# Patient Record
Sex: MALE | Race: WHITE | URBAN - METROPOLITAN AREA
[De-identification: names, ages, dates, MRNs, and addresses within clinical notes are randomized per-mention and may not be internally consistent; named-entity substitution may affect disease eponyms.]

---

## 2018-05-25 ENCOUNTER — EMERGENCY (EMERGENCY)
Facility: HOSPITAL | Age: 68
LOS: 0 days | Discharge: HOME | End: 2018-05-25
Attending: EMERGENCY MEDICINE | Admitting: EMERGENCY MEDICINE

## 2018-05-25 VITALS
RESPIRATION RATE: 18 BRPM | DIASTOLIC BLOOD PRESSURE: 69 MMHG | SYSTOLIC BLOOD PRESSURE: 121 MMHG | TEMPERATURE: 98 F | HEART RATE: 85 BPM | HEIGHT: 73 IN | OXYGEN SATURATION: 97 % | WEIGHT: 240.08 LBS

## 2018-05-25 DIAGNOSIS — M25.562 PAIN IN LEFT KNEE: ICD-10-CM

## 2018-05-25 DIAGNOSIS — Z87.39 PERSONAL HISTORY OF OTHER DISEASES OF THE MUSCULOSKELETAL SYSTEM AND CONNECTIVE TISSUE: ICD-10-CM

## 2018-05-25 RX ORDER — IBUPROFEN 200 MG
600 TABLET ORAL ONCE
Qty: 0 | Refills: 0 | Status: COMPLETED | OUTPATIENT
Start: 2018-05-25 | End: 2018-05-25

## 2018-05-25 RX ADMIN — Medication 600 MILLIGRAM(S): at 09:25

## 2018-05-25 NOTE — ED PROVIDER NOTE - NS ED ROS FT
Constitutional: No fever or chills. Normal appetite.   Eyes: No vision changes.  ENT: No hearing changes. No ear pain. No sore throat.  Neck: No neck pain or stiffness.  MS: No back pain. No calf pain/swelling.  Psych: No suicidal or homicidal ideations.

## 2018-05-25 NOTE — ED PROVIDER NOTE - PHYSICAL EXAMINATION
Constitutional: Well developed, well nourished. NAD. Good general hygiene  Head: Atraumatic.  Eyes: EOMI without discomfort.   ENT: No nasal discharge. Mucous membranes moist.  Neck: Supple. Painless ROM.  Extremities. Pelvis stable. No lower extremity edema. Symmetric calves. Scar to anteromedial aspect of L knee. No erythema or fluctuance. No significant effusion. Discomfort with full passive extension or with passive flexion beyond 90 degrees. Tenderness to the tibial side of the joint lateral to the patella.   Skin: No rashes.   Neuro: AAOx3. No focal neurological deficits.  Psych: Normal mood. Normal affect.

## 2018-05-25 NOTE — ED PROVIDER NOTE - ATTENDING CONTRIBUTION TO CARE
I personally evaluated the patient. I reviewed the Resident’s or Physician Assistant’s note (as assigned above), and agree with the findings and plan except as documented in my note.  knee pain as above, on exam has lateral joint line ttp but joint stable,extensor intact, no warmth/erythema, NVI, xr reviewed, ace wrap applied, to f/u with ortho

## 2018-05-25 NOTE — ED PROVIDER NOTE - OBJECTIVE STATEMENT
68y M w PMH L knee ACL, MCL, medial meniscus repair in the 1980s presents for acute L knee pain yesterday while rising from a chair. 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 No recent physical activity. Pt has pain with full extension and flexion beyond 90 degrees. No known trauma. No f/c/n/v/d. No SOB.  No swelling.

## 2020-08-27 PROBLEM — Z00.00 ENCOUNTER FOR PREVENTIVE HEALTH EXAMINATION: Status: ACTIVE | Noted: 2020-08-27

## 2020-09-11 ENCOUNTER — APPOINTMENT (OUTPATIENT)
Dept: UROLOGY | Facility: CLINIC | Age: 70
End: 2020-09-11

## 2021-05-08 ENCOUNTER — EMERGENCY (EMERGENCY)
Facility: HOSPITAL | Age: 71
LOS: 0 days | Discharge: HOME | End: 2021-05-08
Attending: STUDENT IN AN ORGANIZED HEALTH CARE EDUCATION/TRAINING PROGRAM | Admitting: STUDENT IN AN ORGANIZED HEALTH CARE EDUCATION/TRAINING PROGRAM
Payer: MEDICARE

## 2021-05-08 VITALS
TEMPERATURE: 99 F | DIASTOLIC BLOOD PRESSURE: 94 MMHG | OXYGEN SATURATION: 97 % | SYSTOLIC BLOOD PRESSURE: 167 MMHG | HEART RATE: 62 BPM | RESPIRATION RATE: 20 BRPM

## 2021-05-08 VITALS
TEMPERATURE: 98 F | DIASTOLIC BLOOD PRESSURE: 96 MMHG | SYSTOLIC BLOOD PRESSURE: 163 MMHG | HEART RATE: 84 BPM | HEIGHT: 73 IN | WEIGHT: 240.08 LBS | RESPIRATION RATE: 20 BRPM | OXYGEN SATURATION: 99 %

## 2021-05-08 DIAGNOSIS — Z79.890 HORMONE REPLACEMENT THERAPY: ICD-10-CM

## 2021-05-08 DIAGNOSIS — R10.9 UNSPECIFIED ABDOMINAL PAIN: ICD-10-CM

## 2021-05-08 DIAGNOSIS — N20.2 CALCULUS OF KIDNEY WITH CALCULUS OF URETER: ICD-10-CM

## 2021-05-08 DIAGNOSIS — N32.3 DIVERTICULUM OF BLADDER: ICD-10-CM

## 2021-05-08 DIAGNOSIS — N40.0 BENIGN PROSTATIC HYPERPLASIA WITHOUT LOWER URINARY TRACT SYMPTOMS: ICD-10-CM

## 2021-05-08 LAB
ALBUMIN SERPL ELPH-MCNC: 4.3 G/DL — SIGNIFICANT CHANGE UP (ref 3.5–5.2)
ALP SERPL-CCNC: 69 U/L — SIGNIFICANT CHANGE UP (ref 30–115)
ALT FLD-CCNC: 27 U/L — SIGNIFICANT CHANGE UP (ref 0–41)
ANION GAP SERPL CALC-SCNC: 10 MMOL/L — SIGNIFICANT CHANGE UP (ref 7–14)
APPEARANCE UR: CLEAR — SIGNIFICANT CHANGE UP
AST SERPL-CCNC: 57 U/L — HIGH (ref 0–41)
BASOPHILS # BLD AUTO: 0.09 K/UL — SIGNIFICANT CHANGE UP (ref 0–0.2)
BASOPHILS NFR BLD AUTO: 1.2 % — HIGH (ref 0–1)
BILIRUB SERPL-MCNC: 0.3 MG/DL — SIGNIFICANT CHANGE UP (ref 0.2–1.2)
BILIRUB UR-MCNC: NEGATIVE — SIGNIFICANT CHANGE UP
BUN SERPL-MCNC: 14 MG/DL — SIGNIFICANT CHANGE UP (ref 10–20)
CALCIUM SERPL-MCNC: 9.2 MG/DL — SIGNIFICANT CHANGE UP (ref 8.5–10.1)
CHLORIDE SERPL-SCNC: 103 MMOL/L — SIGNIFICANT CHANGE UP (ref 98–110)
CO2 SERPL-SCNC: 27 MMOL/L — SIGNIFICANT CHANGE UP (ref 17–32)
COLOR SPEC: YELLOW — SIGNIFICANT CHANGE UP
CREAT SERPL-MCNC: 1.5 MG/DL — SIGNIFICANT CHANGE UP (ref 0.7–1.5)
DIFF PNL FLD: NEGATIVE — SIGNIFICANT CHANGE UP
EOSINOPHIL # BLD AUTO: 0.32 K/UL — SIGNIFICANT CHANGE UP (ref 0–0.7)
EOSINOPHIL NFR BLD AUTO: 4.4 % — SIGNIFICANT CHANGE UP (ref 0–8)
GLUCOSE SERPL-MCNC: 114 MG/DL — HIGH (ref 70–99)
GLUCOSE UR QL: NEGATIVE MG/DL — SIGNIFICANT CHANGE UP
HCT VFR BLD CALC: 42.5 % — SIGNIFICANT CHANGE UP (ref 42–52)
HGB BLD-MCNC: 14.2 G/DL — SIGNIFICANT CHANGE UP (ref 14–18)
IMM GRANULOCYTES NFR BLD AUTO: 0.3 % — SIGNIFICANT CHANGE UP (ref 0.1–0.3)
KETONES UR-MCNC: NEGATIVE — SIGNIFICANT CHANGE UP
LACTATE SERPL-SCNC: 0.9 MMOL/L — SIGNIFICANT CHANGE UP (ref 0.7–2)
LEUKOCYTE ESTERASE UR-ACNC: NEGATIVE — SIGNIFICANT CHANGE UP
LIDOCAIN IGE QN: 57 U/L — SIGNIFICANT CHANGE UP (ref 7–60)
LYMPHOCYTES # BLD AUTO: 2.68 K/UL — SIGNIFICANT CHANGE UP (ref 1.2–3.4)
LYMPHOCYTES # BLD AUTO: 37 % — SIGNIFICANT CHANGE UP (ref 20.5–51.1)
MAGNESIUM SERPL-MCNC: 2.1 MG/DL — SIGNIFICANT CHANGE UP (ref 1.8–2.4)
MCHC RBC-ENTMCNC: 31.8 PG — HIGH (ref 27–31)
MCHC RBC-ENTMCNC: 33.4 G/DL — SIGNIFICANT CHANGE UP (ref 32–37)
MCV RBC AUTO: 95.3 FL — HIGH (ref 80–94)
MONOCYTES # BLD AUTO: 0.7 K/UL — HIGH (ref 0.1–0.6)
MONOCYTES NFR BLD AUTO: 9.7 % — HIGH (ref 1.7–9.3)
NEUTROPHILS # BLD AUTO: 3.43 K/UL — SIGNIFICANT CHANGE UP (ref 1.4–6.5)
NEUTROPHILS NFR BLD AUTO: 47.4 % — SIGNIFICANT CHANGE UP (ref 42.2–75.2)
NITRITE UR-MCNC: NEGATIVE — SIGNIFICANT CHANGE UP
NRBC # BLD: 0 /100 WBCS — SIGNIFICANT CHANGE UP (ref 0–0)
PH UR: 6 — SIGNIFICANT CHANGE UP (ref 5–8)
PLATELET # BLD AUTO: 245 K/UL — SIGNIFICANT CHANGE UP (ref 130–400)
POTASSIUM SERPL-MCNC: 5 MMOL/L — SIGNIFICANT CHANGE UP (ref 3.5–5)
POTASSIUM SERPL-SCNC: 5 MMOL/L — SIGNIFICANT CHANGE UP (ref 3.5–5)
PROT SERPL-MCNC: 7 G/DL — SIGNIFICANT CHANGE UP (ref 6–8)
PROT UR-MCNC: NEGATIVE MG/DL — SIGNIFICANT CHANGE UP
RBC # BLD: 4.46 M/UL — LOW (ref 4.7–6.1)
RBC # FLD: 14 % — SIGNIFICANT CHANGE UP (ref 11.5–14.5)
SODIUM SERPL-SCNC: 140 MMOL/L — SIGNIFICANT CHANGE UP (ref 135–146)
SP GR SPEC: >=1.03 (ref 1.01–1.03)
UROBILINOGEN FLD QL: 0.2 MG/DL — SIGNIFICANT CHANGE UP (ref 0.2–0.2)
WBC # BLD: 7.24 K/UL — SIGNIFICANT CHANGE UP (ref 4.8–10.8)
WBC # FLD AUTO: 7.24 K/UL — SIGNIFICANT CHANGE UP (ref 4.8–10.8)

## 2021-05-08 PROCEDURE — 99284 EMERGENCY DEPT VISIT MOD MDM: CPT

## 2021-05-08 PROCEDURE — 74177 CT ABD & PELVIS W/CONTRAST: CPT | Mod: 26,MA

## 2021-05-08 RX ORDER — SODIUM CHLORIDE 9 MG/ML
1000 INJECTION INTRAMUSCULAR; INTRAVENOUS; SUBCUTANEOUS ONCE
Refills: 0 | Status: COMPLETED | OUTPATIENT
Start: 2021-05-08 | End: 2021-05-08

## 2021-05-08 RX ORDER — KETOROLAC TROMETHAMINE 30 MG/ML
15 SYRINGE (ML) INJECTION ONCE
Refills: 0 | Status: DISCONTINUED | OUTPATIENT
Start: 2021-05-08 | End: 2021-05-08

## 2021-05-08 RX ADMIN — Medication 15 MILLIGRAM(S): at 07:30

## 2021-05-08 RX ADMIN — Medication 15 MILLIGRAM(S): at 07:45

## 2021-05-08 RX ADMIN — SODIUM CHLORIDE 1000 MILLILITER(S): 9 INJECTION INTRAMUSCULAR; INTRAVENOUS; SUBCUTANEOUS at 07:30

## 2021-05-08 RX ADMIN — SODIUM CHLORIDE 1000 MILLILITER(S): 9 INJECTION INTRAMUSCULAR; INTRAVENOUS; SUBCUTANEOUS at 08:30

## 2021-05-08 NOTE — ED PROVIDER NOTE - PROVIDER TOKENS
PROVIDER:[TOKEN:[[id number]]],PROVIDER:[TOKEN:[[id number]]],PROVIDER:[TOKEN:[[id number]]]

## 2021-05-08 NOTE — ED PROVIDER NOTE - PATIENT PORTAL LINK FT
You can access the FollowMyHealth Patient Portal offered by [facility] by registering at the following website: [email or web address]. By joining [facility]’s FollowMyHealth portal, you will also be able to view your health information using other applications (apps) compatible with our system.

## 2021-05-08 NOTE — ED PROVIDER NOTE - CARE PLAN
Principal Discharge DX:	Kidney stone   Principal Discharge DX:	Kidney stone  Secondary Diagnosis:	Enlarged prostate  Secondary Diagnosis:	Bladder diverticulum

## 2021-05-08 NOTE — ED PROVIDER NOTE - ATTENDING CONTRIBUTION TO CARE
71 year old quite healthy male with pmh hypothyroidism, remote of of kidney stones who p/w L lower flank pain that started at 2am last night. He had difficulty getting into a comfortable position and reports that pain is just 2-3/10. Denies n/v/f/c, abd pain, CP. He does endorse gradually worsening urinary frequency and hesitancy x2-3 months. Has tried to see a urologist however reports long waiting times d/t clinics being backed up from covid.     Gen - NAD, Head - NCAT, Pharynx - clear, MMM, Heart - RRR, no m/g/r, Lungs - CTAB, no w/c/r, Abdomen - soft, + L CVA ttp, NT, ND, Skin - No rash, Extremities - FROM, no edema, erythema, ecchymosis, brisk cap refill, Neuro - a&o x3, GCS 15    a/p: will eval for uretolithiasis, uti, other intra-abd pathology, and will obtain labs, ct a/p, ua, will give ivf, toradol and reassess.

## 2021-05-08 NOTE — ED PROVIDER NOTE - CLINICAL SUMMARY MEDICAL DECISION MAKING FREE TEXT BOX
71 year old quite healthy male with pmh hypothyroidism, remote of of kidney stones who p/w L lower flank pain that started at 2am last night. He had difficulty getting into a comfortable position and reports that pain is just 2-3/10. Labs and imaging reviewed. IVF, toradol given. CT shows Punctate bladder calculus just distal to the left UVJ.  Bladder wall thickening with perivesical fat stranding. Anterior bladder diverticulum noted. Prostatomegaly. UA shows absolutely no signs of infection and urinary complaints ml d/t BPH. Pt given prompt f/u with urology and pcp. He has no pain and is well appearing. I have fully discussed the medical management and delivery of care with the patient. I have discussed any available labs, imaging and treatment options with the patient. All Questions answered at the bedside and printed copies of all results provided and recommended to review with PCP. Patient confirms understanding and has been given detailed return precautions. Patient instructed to return to the ED should symptoms persist or worsen. Patient has demonstrated capacity and has verbalized understanding. Patient is well appearing upon discharge, ambulatory with a steady gait.

## 2021-05-08 NOTE — ED ADULT NURSE NOTE - PMH
No pertinent past medical history <<----- Click to add NO pertinent Past Medical History Hypothyroid

## 2021-05-08 NOTE — ED PROVIDER NOTE - OBJECTIVE STATEMENT
71y M pmh hypothyroid, kidney stones presents for eval of L flank pain. Pt has mild sharp L flank pain for a couple days, no aggravating or relieving factors. Denies fever, dysuria, hematuria, n/v/d/c, cp, sob

## 2021-05-08 NOTE — ED PROVIDER NOTE - NSFOLLOWUPINSTRUCTIONS_ED_ALL_ED_FT
Follow up with PMD and Urology in 1-2 days.    Kidney Stones    Kidney stones (urolithiasis) are crystal deposits that form inside your kidneys. Pain is caused by the stone moving through the urinary tract, causing spasms of the ureter. Drink enough water and fluids to keep your urine clear or pale yellow. This will help you to pass the stone or stone fragments. If provided a strainer, strain all urine and keep all particulate matter and stones for a follow up appointment with a urologist.    SEEK IMMEDIATE MEDICAL CARE IF YOU HAVE ANY OF THE FOLLOWING SYMPTOMS: pain not controlled with medication, fever/chills, worsening vomiting, inability to urinate, or dizziness/lightheadedness.

## 2021-05-08 NOTE — ED PROVIDER NOTE - CARE PROVIDERS DIRECT ADDRESSES
[email or web address]

## 2021-05-08 NOTE — ED PROVIDER NOTE - PHYSICAL EXAMINATION
CONST: NAD  EYES: Sclera and conjunctiva clear.   ENT: No nasal discharge. Oropharynx normal appearing, no erythema or exudates. No abscess or swelling. Uvula midline.   NECK: Non-tender, no meningeal signs. normal ROM. supple   CARD: S1 S2; No jvd  RESP: Equal BS B/L, No wheezes, rhonchi or rales. No distress  GI: L CVA tenderness. Soft, non-tender, non-distended. normal BS  MS: Normal ROM in all extremities. pulses 2 +. no calf tenderness or swelling  SKIN: Warm, dry, no acute rashes. Good turgor  NEURO: A&Ox3, No focal deficits. Strength 5/5 with no sensory deficits.

## 2021-05-08 NOTE — ED PROVIDER NOTE - CARE PROVIDER_API CALL
Prem Coleman)  Urology  [address] [place] [address], [email or web address]  [place] [address]  Phone: [phone]  Fax: [phone]  Follow Up Time:     Gurpreet Samuels)  Urology  [address] [place] [address], [place] [address]  Phone: [phone]  Fax: [phone]  Follow Up Time:     Anisha Barger)  Urology  [address] [place] [address], [place] [address]  Phone: [phone]  Fax: [phone]  Follow Up Time:

## 2021-05-08 NOTE — ED PROVIDER NOTE - PROGRESS NOTE DETAILS
JR: labs reviewed and wnl. CT wet read by me shows distal L ureteral stone ~4mm and multiple non-obstructing intraparenchymal stones

## 2021-05-09 LAB
CULTURE RESULTS: SIGNIFICANT CHANGE UP
SPECIMEN SOURCE: SIGNIFICANT CHANGE UP

## 2022-08-10 ENCOUNTER — INPATIENT (INPATIENT)
Facility: HOSPITAL | Age: 72
LOS: 1 days | Discharge: HOME | End: 2022-08-12

## 2022-08-10 ENCOUNTER — EMERGENCY (EMERGENCY)
Facility: HOSPITAL | Age: 72
LOS: 0 days | Discharge: HOME | End: 2022-08-11
Admitting: EMERGENCY MEDICINE

## 2022-08-10 VITALS
DIASTOLIC BLOOD PRESSURE: 94 MMHG | HEART RATE: 73 BPM | RESPIRATION RATE: 18 BRPM | SYSTOLIC BLOOD PRESSURE: 149 MMHG | HEIGHT: 73 IN | WEIGHT: 227.96 LBS | OXYGEN SATURATION: 94 % | TEMPERATURE: 98 F

## 2022-08-10 DIAGNOSIS — R07.9 CHEST PAIN, UNSPECIFIED: ICD-10-CM

## 2022-08-10 DIAGNOSIS — Z20.822 CONTACT WITH AND (SUSPECTED) EXPOSURE TO COVID-19: ICD-10-CM

## 2022-08-10 DIAGNOSIS — I48.91 UNSPECIFIED ATRIAL FIBRILLATION: ICD-10-CM

## 2022-08-10 DIAGNOSIS — E03.9 HYPOTHYROIDISM, UNSPECIFIED: ICD-10-CM

## 2022-08-10 DIAGNOSIS — Z96.659 PRESENCE OF UNSPECIFIED ARTIFICIAL KNEE JOINT: Chronic | ICD-10-CM

## 2022-08-10 LAB
ALBUMIN SERPL ELPH-MCNC: 4.2 G/DL — SIGNIFICANT CHANGE UP (ref 3.5–5.2)
ALP SERPL-CCNC: 100 U/L — SIGNIFICANT CHANGE UP (ref 30–115)
ALT FLD-CCNC: 12 U/L — SIGNIFICANT CHANGE UP (ref 0–41)
ANION GAP SERPL CALC-SCNC: 12 MMOL/L — SIGNIFICANT CHANGE UP (ref 7–14)
APTT BLD: 34 SEC — SIGNIFICANT CHANGE UP (ref 27–39.2)
AST SERPL-CCNC: 15 U/L — SIGNIFICANT CHANGE UP (ref 0–41)
BASOPHILS # BLD AUTO: 0.07 K/UL — SIGNIFICANT CHANGE UP (ref 0–0.2)
BASOPHILS NFR BLD AUTO: 0.9 % — SIGNIFICANT CHANGE UP (ref 0–1)
BILIRUB SERPL-MCNC: 0.3 MG/DL — SIGNIFICANT CHANGE UP (ref 0.2–1.2)
BUN SERPL-MCNC: 17 MG/DL — SIGNIFICANT CHANGE UP (ref 10–20)
CALCIUM SERPL-MCNC: 9.2 MG/DL — SIGNIFICANT CHANGE UP (ref 8.5–10.1)
CHLORIDE SERPL-SCNC: 105 MMOL/L — SIGNIFICANT CHANGE UP (ref 98–110)
CO2 SERPL-SCNC: 25 MMOL/L — SIGNIFICANT CHANGE UP (ref 17–32)
CREAT SERPL-MCNC: 1.1 MG/DL — SIGNIFICANT CHANGE UP (ref 0.7–1.5)
EGFR: 71 ML/MIN/1.73M2 — SIGNIFICANT CHANGE UP
EOSINOPHIL # BLD AUTO: 0.23 K/UL — SIGNIFICANT CHANGE UP (ref 0–0.7)
EOSINOPHIL NFR BLD AUTO: 2.8 % — SIGNIFICANT CHANGE UP (ref 0–8)
GLUCOSE SERPL-MCNC: 105 MG/DL — HIGH (ref 70–99)
HCT VFR BLD CALC: 47.3 % — SIGNIFICANT CHANGE UP (ref 42–52)
HGB BLD-MCNC: 15.6 G/DL — SIGNIFICANT CHANGE UP (ref 14–18)
IMM GRANULOCYTES NFR BLD AUTO: 0.2 % — SIGNIFICANT CHANGE UP (ref 0.1–0.3)
INR BLD: 1.01 RATIO — SIGNIFICANT CHANGE UP (ref 0.65–1.3)
LYMPHOCYTES # BLD AUTO: 2.79 K/UL — SIGNIFICANT CHANGE UP (ref 1.2–3.4)
LYMPHOCYTES # BLD AUTO: 34.4 % — SIGNIFICANT CHANGE UP (ref 20.5–51.1)
MCHC RBC-ENTMCNC: 30 PG — SIGNIFICANT CHANGE UP (ref 27–31)
MCHC RBC-ENTMCNC: 33 G/DL — SIGNIFICANT CHANGE UP (ref 32–37)
MCV RBC AUTO: 91 FL — SIGNIFICANT CHANGE UP (ref 80–94)
MONOCYTES # BLD AUTO: 1.05 K/UL — HIGH (ref 0.1–0.6)
MONOCYTES NFR BLD AUTO: 12.9 % — HIGH (ref 1.7–9.3)
NEUTROPHILS # BLD AUTO: 3.96 K/UL — SIGNIFICANT CHANGE UP (ref 1.4–6.5)
NEUTROPHILS NFR BLD AUTO: 48.8 % — SIGNIFICANT CHANGE UP (ref 42.2–75.2)
NRBC # BLD: 0 /100 WBCS — SIGNIFICANT CHANGE UP (ref 0–0)
PLATELET # BLD AUTO: 303 K/UL — SIGNIFICANT CHANGE UP (ref 130–400)
POTASSIUM SERPL-MCNC: 4.3 MMOL/L — SIGNIFICANT CHANGE UP (ref 3.5–5)
POTASSIUM SERPL-SCNC: 4.3 MMOL/L — SIGNIFICANT CHANGE UP (ref 3.5–5)
PROT SERPL-MCNC: 6.1 G/DL — SIGNIFICANT CHANGE UP (ref 6–8)
PROTHROM AB SERPL-ACNC: 11.6 SEC — SIGNIFICANT CHANGE UP (ref 9.95–12.87)
RBC # BLD: 5.2 M/UL — SIGNIFICANT CHANGE UP (ref 4.7–6.1)
RBC # FLD: 13.2 % — SIGNIFICANT CHANGE UP (ref 11.5–14.5)
SARS-COV-2 RNA SPEC QL NAA+PROBE: SIGNIFICANT CHANGE UP
SODIUM SERPL-SCNC: 142 MMOL/L — SIGNIFICANT CHANGE UP (ref 135–146)
TROPONIN T SERPL-MCNC: <0.01 NG/ML — SIGNIFICANT CHANGE UP
WBC # BLD: 8.12 K/UL — SIGNIFICANT CHANGE UP (ref 4.8–10.8)
WBC # FLD AUTO: 8.12 K/UL — SIGNIFICANT CHANGE UP (ref 4.8–10.8)

## 2022-08-10 PROCEDURE — 99285 EMERGENCY DEPT VISIT HI MDM: CPT | Mod: FS

## 2022-08-10 PROCEDURE — 71045 X-RAY EXAM CHEST 1 VIEW: CPT | Mod: 26

## 2022-08-10 PROCEDURE — 93010 ELECTROCARDIOGRAM REPORT: CPT | Mod: 76

## 2022-08-10 RX ORDER — DILTIAZEM HCL 120 MG
10 CAPSULE, EXT RELEASE 24 HR ORAL ONCE
Refills: 0 | Status: COMPLETED | OUTPATIENT
Start: 2022-08-10 | End: 2022-08-10

## 2022-08-10 RX ORDER — LEVOTHYROXINE SODIUM 125 MCG
0 TABLET ORAL
Qty: 0 | Refills: 0 | DISCHARGE

## 2022-08-10 RX ADMIN — Medication 10 MILLIGRAM(S): at 23:21

## 2022-08-10 NOTE — ED ADULT NURSE NOTE - NSIMPLEMENTINTERV_GEN_ALL_ED
Implemented All Universal Safety Interventions:  [place] to call system. Call bell, personal items and telephone within reach. Instruct patient to call for assistance. Room bathroom lighting operational. Non-slip footwear when patient is off stretcher. Physically safe environment: no spills, clutter or unnecessary equipment. Stretcher in lowest position, wheels locked, appropriate side rails in place.

## 2022-08-11 PROBLEM — E03.9 HYPOTHYROIDISM, UNSPECIFIED: Chronic | Status: ACTIVE | Noted: 2021-05-08

## 2022-08-11 LAB
CK MB CFR SERPL CALC: 4.4 NG/ML — SIGNIFICANT CHANGE UP (ref 0.6–6.3)
CK SERPL-CCNC: 132 U/L — SIGNIFICANT CHANGE UP (ref 0–225)
TROPONIN T SERPL-MCNC: <0.01 NG/ML — SIGNIFICANT CHANGE UP

## 2022-08-11 PROCEDURE — 93010 ELECTROCARDIOGRAM REPORT: CPT

## 2022-08-11 RX ORDER — LEVOTHYROXINE SODIUM 125 MCG
200 TABLET ORAL DAILY
Refills: 0 | Status: DISCONTINUED | OUTPATIENT
Start: 2022-08-11 | End: 2022-08-12

## 2022-08-11 RX ORDER — ENOXAPARIN SODIUM 100 MG/ML
100 INJECTION SUBCUTANEOUS EVERY 12 HOURS
Refills: 0 | Status: DISCONTINUED | OUTPATIENT
Start: 2022-08-11 | End: 2022-08-12

## 2022-08-11 RX ORDER — DILTIAZEM HCL 120 MG
5 CAPSULE, EXT RELEASE 24 HR ORAL
Qty: 125 | Refills: 0 | Status: DISCONTINUED | OUTPATIENT
Start: 2022-08-11 | End: 2022-08-11

## 2022-08-11 RX ORDER — METOPROLOL TARTRATE 50 MG
25 TABLET ORAL DAILY
Refills: 0 | Status: DISCONTINUED | OUTPATIENT
Start: 2022-08-11 | End: 2022-08-12

## 2022-08-11 RX ORDER — ENOXAPARIN SODIUM 100 MG/ML
100 INJECTION SUBCUTANEOUS ONCE
Refills: 0 | Status: COMPLETED | OUTPATIENT
Start: 2022-08-11 | End: 2022-08-11

## 2022-08-11 RX ADMIN — ENOXAPARIN SODIUM 100 MILLIGRAM(S): 100 INJECTION SUBCUTANEOUS at 13:13

## 2022-08-11 RX ADMIN — Medication 25 MILLIGRAM(S): at 10:53

## 2022-08-11 RX ADMIN — ENOXAPARIN SODIUM 100 MILLIGRAM(S): 100 INJECTION SUBCUTANEOUS at 01:53

## 2022-08-11 RX ADMIN — Medication 200 MICROGRAM(S): at 06:37

## 2022-08-11 NOTE — H&P ADULT - HISTORY OF PRESENT ILLNESS
72 years old male c/o of chest pain that started 1 hour prior to ED arrival when  he was watching TV  Pt with a  past  med  history of hypothyroidism .  EMS following rapid A. fib as well as ED EKG show rapid A. fib.  Because symptoms improved prior to ED arrival.  Denies similar symptoms in the past.  Chest pain associated with diaphoresis but denies shortness of breath and weakness.  He denies history of cardiac work-up.  Otherwise denies exogenous hormone use/recent hospitalization/hx of DVT  Pt given cardizem in ER converted to WNL  EKG

## 2022-08-11 NOTE — ED PROVIDER NOTE - NS ED ATTENDING STATEMENT MOD
This was a shared visit with the LISET. I reviewed and verified the documentation and independently performed the documented:

## 2022-08-11 NOTE — ED PROVIDER NOTE - OBJECTIVE STATEMENT
72 years old male history of hypothyroidism present complaint of chest pain that started 1 hour prior to ED arrival while he was watching TV.  EMS following rapid A. fib as well as ED EKG show rapid A. fib.  Because symptoms improved prior to ED arrival.  Denies similar symptoms in the past.  Chest pain associated with diaphoresis but denies shortness of breath and weakness.  He denies history of cardiac work-up.  Otherwise denies exogenous hormone use/recent hospitalization/hx of DVT. denies recent illness/fever/chill/HA/dizziness/sob/abd pain/n/v/d/urinary sxs.

## 2022-08-11 NOTE — ED PROVIDER NOTE - CLINICAL SUMMARY MEDICAL DECISION MAKING FREE TEXT BOX
72 old male with history of hypothyroidism here for assessment of acute on top chest pain with associated diaphoresis and palpitations.  No dyspnea, nausea, vomiting, dizziness.  No recent illness, travel.  No new meds or change in medication dose.    Pain described as fullness, palpitations described as fluttering.    Patient found to be in rapid A. fib.  Exam otherwise unremarkable.    While in ED and prior to initiation of meds, patietn converted to NSR.     Labs unremarkable.    Will admit for further monitoring of new onset afib, cards eval, echo and consideration of initiation of AC. Patient's CHADSVASC score is 1.

## 2022-08-11 NOTE — ED ADULT NURSE REASSESSMENT NOTE - NS ED NURSE REASSESS COMMENT FT1
Pt remains in stable condition, HR 70, denies pain, pt admit to tele, safety in maintained, monitoring continues.

## 2022-08-11 NOTE — ED ADULT NURSE REASSESSMENT NOTE - NS ED NURSE REASSESS COMMENT FT1
Received patient lying in bed alert oriented and in no acute distress. Patient denied chestpain, palpitation, shortness of breath or any other form of discomfort. Cardiac monitoring in progress, A-fib on the monitor-rate 60s-70s. Patient admitted to telemetry, pending bed assignment. [place]

## 2022-08-11 NOTE — ED PROVIDER NOTE - NS ED ROS FT
Constitutional: no fever, chills, no recent weight loss, change in appetite or malaise  Eyes: no redness/discharge/pain/vision changes  ENT: no rhinorrhea/ear pain/sore throat  Cardiac: See HPI  Respiratory: No cough or respiratory distress  GI: No nausea, vomiting, diarrhea or abdominal pain.  : No dysuria, frequency, urgency or hematuria  MS: no pain to back or extremities, no loss of ROM, no weakness  Neuro: No headache or weakness. No LOC.  Skin: No skin rash.  Endocrine: + thyroid disease

## 2022-08-11 NOTE — ED ADULT NURSE REASSESSMENT NOTE - NS ED NURSE REASSESS COMMENT FT1
Pt alert and oriented X4, pt denies pain, pt HR 80, hold Diltiazem as per TAINA Lockett, pt admit to tele, safety in place, will continue to monitor Pt alert and oriented X4, pt denies pain, pt HR 80, hold Diltiazem infusion as per TAINA Lockett, pt admit to tele, safety in place, will continue to monitor

## 2022-08-11 NOTE — PATIENT PROFILE ADULT - NS PRO AD NO ADVANCE DIRECTIVE
Rajeev RAEGAN Newell III is in for 4 month follow up for a  Right TKA.  He is doing  well.  No pain in the knee.  He has resumed activities of daily living. His knee laceration is healed  Exam demonstrates  A well developed male in no distress.  Alert and oriented.  Mood and affect are appropriate.    Knee incision is well healed.  ROM is 0-120.  The patella tracks well and there is no instability. The extremity is neurovascularly intact.          Imp:Doing well    F/u in [facility] with xrauys of both knees.  He would like to schedule the other side      
No

## 2022-08-11 NOTE — H&P ADULT - ASSESSMENT
72 years old male c/o of chest pain that started 1 hour prior to ED arrival when  he was watching TV  Pt with a  past  med  history of hypothyroidism .  EMS following rapid A. fib as well as ED EKG show rapid A. fib.  Because symptoms improved prior to ED arrival.  Denies similar symptoms in the past.  Chest pain associated with diaphoresis but denies shortness of breath and weakness.  He denies history of cardiac work-up.  Otherwise denies exogenous hormone use/recent hospitalization/hx of DVT  Pt given cardizem in ER converted to WNL  EKG   72 years old male c/o of chest pain that started 1 hour prior to ED arrival when  he was watching TV  Pt with a  past  med  history of hypothyroidism .  EMS following rapid A. fib as well as ED EKG show rapid A. fib.  Because symptoms improved prior to ED arrival.  Denies similar symptoms in the past.  Chest pain associated with diaphoresis but denies shortness of breath and weakness.  He denies history of cardiac work-up.  Otherwise denies exogenous hormone use/recent hospitalization/hx of DVT  Pt given cardizem in ER converted to WNL  EKG    DX new onset of afib:  tele  CEx3  cardizem given in ER rate converted at that time  prn cardizem   72 years old male c/o of chest pain that started 1 hour prior to ED arrival when  he was watching TV  Pt with a  past  med  history of hypothyroidism .  EMS following rapid A. fib as well as ED EKG show rapid A. fib.  Because symptoms improved prior to ED arrival.  Denies similar symptoms in the past.  Chest pain associated with diaphoresis but denies shortness of breath and weakness.  He denies history of cardiac work-up.  Otherwise denies exogenous hormone use/recent hospitalization/hx of DVT  Pt given cardizem in ER converted to WNL  EKG    DX new onset of afib:  tele  check TSH, Free T4  pt on levothyroxine   if normal outpt cardiac w/u  CEx3  cardizem given in ER rate converted at that time  prn cardizem

## 2022-08-11 NOTE — H&P ADULT - NSHPPHYSICALEXAM_GEN_ALL_CORE
GENERAL:  73y/o Male NAD, resting comfortably.  HEAD:  Atraumatic, Normocephalic  EYES: EOMI, PERRLA, conjunctiva and sclera clear  NECK: Supple, No JVD, no cervical lymphadenopathy, non-tender  CHEST/LUNG: Clear to auscultation bilaterally; No wheeze, rhonchi, or rales  HEART: see history /afib in ER  ABDOMEN: Soft, Nontender, Nondistended x 4 quadrants; Bowel sounds present  EXTREMITIES:   Peripheral Pulses Present, No clubbing, no cyanosis, or no edema, no calf tenderness  PSYCH: AAOx3, cooperative, appropriate  NEUROLOGY: WNL  SKIN: WNL

## 2022-08-11 NOTE — ED PROVIDER NOTE - PHYSICAL EXAMINATION
CONSTITUTIONAL: Well-appearing; well-nourished; in no apparent distress.   EYES: PERRL; EOM intact.   CARDIOVASCULAR: Tachycardic and irregular.  Normal S1, S2; no murmurs, rubs, or gallops.   RESPIRATORY: Normal chest excursion with respiration; breath sounds clear and equal bilaterally; no wheezes, rhonchi, or rales.  GI/: Normal bowel sounds; non-distended; non-tender; no palpable organomegaly.   MS: No calf swelling and tenderness.  SKIN: Normal for age and race; warm; dry; good turgor; no apparent lesions or exudate.   NEURO/PSYCH: A & O x 4; grossly unremarkable.

## 2022-08-12 ENCOUNTER — TRANSCRIPTION ENCOUNTER (OUTPATIENT)
Age: 72
End: 2022-08-12

## 2022-08-12 VITALS — OXYGEN SATURATION: 97 %

## 2022-08-12 LAB
ALBUMIN SERPL ELPH-MCNC: 3.8 G/DL — SIGNIFICANT CHANGE UP (ref 3.5–5.2)
ALP SERPL-CCNC: 91 U/L — SIGNIFICANT CHANGE UP (ref 30–115)
ALT FLD-CCNC: 12 U/L — SIGNIFICANT CHANGE UP (ref 0–41)
ANION GAP SERPL CALC-SCNC: 16 MMOL/L — HIGH (ref 7–14)
AST SERPL-CCNC: 15 U/L — SIGNIFICANT CHANGE UP (ref 0–41)
BILIRUB SERPL-MCNC: 0.5 MG/DL — SIGNIFICANT CHANGE UP (ref 0.2–1.2)
BUN SERPL-MCNC: 16 MG/DL — SIGNIFICANT CHANGE UP (ref 10–20)
CALCIUM SERPL-MCNC: 8.6 MG/DL — SIGNIFICANT CHANGE UP (ref 8.5–10.1)
CHLORIDE SERPL-SCNC: 103 MMOL/L — SIGNIFICANT CHANGE UP (ref 98–110)
CO2 SERPL-SCNC: 24 MMOL/L — SIGNIFICANT CHANGE UP (ref 17–32)
CREAT SERPL-MCNC: 0.9 MG/DL — SIGNIFICANT CHANGE UP (ref 0.7–1.5)
EGFR: 90 ML/MIN/1.73M2 — SIGNIFICANT CHANGE UP
GLUCOSE SERPL-MCNC: 105 MG/DL — HIGH (ref 70–99)
HCT VFR BLD CALC: 44.8 % — SIGNIFICANT CHANGE UP (ref 42–52)
HGB BLD-MCNC: 14.9 G/DL — SIGNIFICANT CHANGE UP (ref 14–18)
MAGNESIUM SERPL-MCNC: 2 MG/DL — SIGNIFICANT CHANGE UP (ref 1.8–2.4)
MCHC RBC-ENTMCNC: 30.5 PG — SIGNIFICANT CHANGE UP (ref 27–31)
MCHC RBC-ENTMCNC: 33.3 G/DL — SIGNIFICANT CHANGE UP (ref 32–37)
MCV RBC AUTO: 91.6 FL — SIGNIFICANT CHANGE UP (ref 80–94)
NRBC # BLD: 0 /100 WBCS — SIGNIFICANT CHANGE UP (ref 0–0)
PLATELET # BLD AUTO: 272 K/UL — SIGNIFICANT CHANGE UP (ref 130–400)
POTASSIUM SERPL-MCNC: 4 MMOL/L — SIGNIFICANT CHANGE UP (ref 3.5–5)
POTASSIUM SERPL-SCNC: 4 MMOL/L — SIGNIFICANT CHANGE UP (ref 3.5–5)
PROT SERPL-MCNC: 5.7 G/DL — LOW (ref 6–8)
RBC # BLD: 4.89 M/UL — SIGNIFICANT CHANGE UP (ref 4.7–6.1)
RBC # FLD: 13.2 % — SIGNIFICANT CHANGE UP (ref 11.5–14.5)
SODIUM SERPL-SCNC: 143 MMOL/L — SIGNIFICANT CHANGE UP (ref 135–146)
T4 FREE SERPL-MCNC: 1.9 NG/DL — HIGH (ref 0.9–1.8)
TSH SERPL-MCNC: 0.16 UIU/ML — LOW (ref 0.27–4.2)
WBC # BLD: 6.79 K/UL — SIGNIFICANT CHANGE UP (ref 4.8–10.8)
WBC # FLD AUTO: 6.79 K/UL — SIGNIFICANT CHANGE UP (ref 4.8–10.8)

## 2022-08-12 RX ORDER — METOPROLOL TARTRATE 50 MG
1 TABLET ORAL
Qty: 30 | Refills: 0
Start: 2022-08-12 | End: 2022-09-10

## 2022-08-12 RX ORDER — APIXABAN 2.5 MG/1
1 TABLET, FILM COATED ORAL
Qty: 60 | Refills: 0
Start: 2022-08-12 | End: 2022-09-10

## 2022-08-12 RX ADMIN — Medication 200 MICROGRAM(S): at 06:07

## 2022-08-12 RX ADMIN — Medication 25 MILLIGRAM(S): at 06:06

## 2022-08-12 RX ADMIN — ENOXAPARIN SODIUM 100 MILLIGRAM(S): 100 INJECTION SUBCUTANEOUS at 06:05

## 2022-08-12 NOTE — DISCHARGE NOTE PROVIDER - PROVIDER TOKENS
PROVIDER:[TOKEN:[[id number]],SCHEDULEDAPPT:[08/16/2022],SCHEDULEDAPPTTIME:[02:00 PM],ESTABLISHEDPATIENT:[T]]

## 2022-08-12 NOTE — DISCHARGE NOTE PROVIDER - HOSPITAL COURSE
72 years old male with hx hypothyroid present c/o of chest pain that started 1 hour prior to ED arrival when he was watching TV.  In ED pt was found to have new onset Afib. Pt was started on Lovenox and Cardizem drip and he converted to NSR.   Pt was admitted to telemetry and monitored overnight. Cardizem drip was dc'ed and pt started on Toprol xl 25 mg.   This morning pt is doing well. HR in 60s. He is medically stable for a hospital dc on po eliquis and toprol and outpatient follow up with Dr. Randall on 8/16.

## 2022-08-12 NOTE — DISCHARGE NOTE PROVIDER - NSDCCPCAREPLAN_GEN_ALL_CORE_FT
PRINCIPAL DISCHARGE DIAGNOSIS  Diagnosis: New onset atrial fibrillation  Assessment and Plan of Treatment: You were found to have an irregularly irregular rhythm called atrial fibrillation.   You were started on medication toprol, which controls your rate and improved your symptoms. Please continue medication for rate control.   You were also started on a medication for stroke prevention. Continue it as advised.   Please follow up with Dr. Randall on tue 8/16 at 2 pm

## 2022-08-12 NOTE — DISCHARGE NOTE NURSING/CASE MANAGEMENT/SOCIAL WORK - NSDCPEFALRISK_GEN_ALL_CORE
For information on Fall & Injury Prevention, visit: [email or web address] OR  [email or web address] OR  https://www.cdc.gov/steadi/patient.html

## 2022-08-12 NOTE — DISCHARGE NOTE NURSING/CASE MANAGEMENT/SOCIAL WORK - PATIENT PORTAL LINK FT
You can access the FollowMyHealth Patient Portal offered by [facility] by registering at the following website: [email or web address]. By joining [employer]’s FollowMyHealth portal, you will also be able to view your health information using other applications (apps) compatible with our system.

## 2022-08-12 NOTE — DISCHARGE NOTE PROVIDER - CARE PROVIDER_API CALL
Walter Randall (MD)  Medicine  [address] [place] [address]  Phone: [phone]  Fax: [phone]  Established Patient  Scheduled Appointment: 08/16/2022 02:00 PM

## 2022-08-12 NOTE — DISCHARGE NOTE PROVIDER - NSDCMRMEDTOKEN_GEN_ALL_CORE_FT
Eliquis 5 mg oral tablet: 1 tab(s) orally 2 times a day   levothyroxine 200 mcg (0.2 mg) oral tablet: 1 tab(s) orally once a day  Toprol-XL 25 mg oral tablet, extended release: 1 tab(s) orally once a day

## 2022-08-19 DIAGNOSIS — I48.91 UNSPECIFIED ATRIAL FIBRILLATION: ICD-10-CM

## 2022-08-19 DIAGNOSIS — E03.9 HYPOTHYROIDISM, UNSPECIFIED: ICD-10-CM

## 2022-08-19 DIAGNOSIS — Z96.652 PRESENCE OF LEFT ARTIFICIAL KNEE JOINT: ICD-10-CM

## 2023-04-27 NOTE — ED PROVIDER NOTE - NS ED MD DISPO DISCHARGE CCDA
Patient is alert with confusion. Patient is a 1:1 for safety. Patient is on soft 4-point restraints. Patient was increasingly agitated at the beginning of shift. Patient was refusing all oral medications, and was given IM Zyprexa x2 and IM Geodon x1 per new orders by MD. Patient would fall asleep occasionally for  short amount of time then wake up and thrash around in the bed. Dr. [place] and house officer came up and saw pt. Stat labs were ordered, with a critical CK and updated MD on results. CT ordered for pt- pt has not gone for CT de to behaviors and pt needing to be still for scan. Patient able to fall asleep at 0400.      Patient/Caregiver provided printed discharge information.

## 2023-07-15 ENCOUNTER — EMERGENCY (EMERGENCY)
Facility: HOSPITAL | Age: 73
LOS: 0 days | Discharge: HOME | End: 2023-07-15
Attending: EMERGENCY MEDICINE
Payer: MEDICARE

## 2023-07-15 VITALS
WEIGHT: 240.08 LBS | HEART RATE: 90 BPM | TEMPERATURE: 96 F | DIASTOLIC BLOOD PRESSURE: 90 MMHG | OXYGEN SATURATION: 97 % | SYSTOLIC BLOOD PRESSURE: 118 MMHG | RESPIRATION RATE: 20 BRPM

## 2023-07-15 DIAGNOSIS — Z96.652 PRESENCE OF LEFT ARTIFICIAL KNEE JOINT: ICD-10-CM

## 2023-07-15 DIAGNOSIS — Z79.01 LONG TERM (CURRENT) USE OF ANTICOAGULANTS: ICD-10-CM

## 2023-07-15 DIAGNOSIS — M79.671 PAIN IN RIGHT FOOT: ICD-10-CM

## 2023-07-15 DIAGNOSIS — Z96.659 PRESENCE OF UNSPECIFIED ARTIFICIAL KNEE JOINT: Chronic | ICD-10-CM

## 2023-07-15 DIAGNOSIS — E03.9 HYPOTHYROIDISM, UNSPECIFIED: ICD-10-CM

## 2023-07-15 DIAGNOSIS — Z98.1 ARTHRODESIS STATUS: ICD-10-CM

## 2023-07-15 DIAGNOSIS — M76.61 ACHILLES TENDINITIS, RIGHT LEG: ICD-10-CM

## 2023-07-15 PROCEDURE — 73610 X-RAY EXAM OF ANKLE: CPT | Mod: 26,RT

## 2023-07-15 PROCEDURE — 76882 US LMTD JT/FCL EVL NVASC XTR: CPT | Mod: 26,RT

## 2023-07-15 PROCEDURE — 29515 APPLICATION SHORT LEG SPLINT: CPT | Mod: RT

## 2023-07-15 PROCEDURE — 73610 X-RAY EXAM OF ANKLE: CPT | Mod: RT

## 2023-07-15 PROCEDURE — 76882 US LMTD JT/FCL EVL NVASC XTR: CPT | Mod: RT

## 2023-07-15 PROCEDURE — 99285 EMERGENCY DEPT VISIT HI MDM: CPT | Mod: FS

## 2023-07-15 PROCEDURE — 99284 EMERGENCY DEPT VISIT MOD MDM: CPT | Mod: 25

## 2023-07-15 NOTE — ED PROVIDER NOTE - CLINICAL SUMMARY MEDICAL DECISION MAKING FREE TEXT BOX
73-year-old male past medical history of hypothyroidism, lumbar spine fusion, knee surgery who presents to the emergency department with right heel pain that started this morning.  Patient has nowhere started having sharp pain to his Achilles area on the right and this was worsened with ambulation pain was radiating up his leg.  Patient denies any falls or direct trauma.  Patient not hear any snap or pop.  Patient has not been on antibiotics recently.  No fever or signs of infectious etiology.    On exam, vital signs reviewed.  Patient is nontoxic-appearing.  Patient is neurovascular intact is tenderness to Achilles insertion to right heel.  Padilla test is normal patient has no calf tenderness.  X-rays taken and show no acute issues.  Bedside ultrasound performed and shows intact Achilles tendon with some calcified changes to the insertion site.  Plan made to splint patient in for patient to take anti-inflammatory course.  Will discharge with orthopedics follow-up.  All questions and concerns addressed and patient understands home care.  Patient understands he may require an MRI for further work-up.    Full DC instructions discussed and patient knows when to seek immediate medical attention.  Patient has proper follow up.  All results discussed and patient aware they may require further work up.  Proper follow up ensured. Limitations of ED work up discussed.  Medications administered and prescribed/OTC home meds discussed.  All questions and concerns from patient or family addressed. Understanding of instructions verbalized.

## 2023-07-15 NOTE — ED ADULT NURSE NOTE - CAS ELECT INFOMATION PROVIDED
Pt contacted to review pathology results.  Pt notified of benign path.  Pt reports she is doing well PO with minimal bleeding and pain.  She is tolerating po well and has no complaints.  Pt instructed to keep her PO appt.   
DC instructions

## 2023-07-15 NOTE — ED PROVIDER NOTE - PHYSICAL EXAMINATION
Vital Signs: I have reviewed the initial vital signs.  Constitutional: well-nourished, no acute distress  Musculoskeletal: right ankle- good ROM of extremity,  +tenderness to achilles insertion into heel, no bony tenderness, no deformity, good peripheral pulses  Integumentary: (-) laceration, (-) ecchymosis (-) swelling   Neurologic: awake, alert, extremities’ motor and sensory functions grossly intact, no focal deficits  heme: (-) no adenopathy (-)lymphangitis

## 2023-07-15 NOTE — ED PROVIDER NOTE - DIFFERENTIAL DIAGNOSIS
The differential diagnosis for patients clinical presentation includes but is not limited to:  Achilles rupture, calf tear, fracture, bone spur, DVT Differential Diagnosis

## 2023-07-15 NOTE — ED PROVIDER NOTE - NSPTACCESSSVCSAPPTDETAILS_ED_ALL_ED_FT
Problem: Safety  Goal: Will remain free from injury  Q15 minute obs in place.    Problem: Urinary Elimination:  Goal: Ability to reestablish a normal urinary elimination pattern will improve    Intervention: Assess and monitor for signs and symptoms of urinary retention  Cheek removed. Will monitor for retention.         achilles tendonitis

## 2023-07-15 NOTE — ED ADULT NURSE NOTE - NSFALLUNIVINTERV_ED_ALL_ED
Bed/Stretcher in lowest position, wheels locked, appropriate side rails in place/Call bell, personal items and telephone in reach/Instruct patient to call for assistance before getting out of bed/chair/stretcher/Non-slip footwear applied when patient is off stretcher/[place] to call system/Physically safe environment - no spills, clutter or unnecessary equipment/Purposeful proactive rounding/Room/bathroom lighting operational, light cord in reach

## 2023-07-15 NOTE — ED PROVIDER NOTE - PATIENT PORTAL LINK FT
You can access the FollowMyHealth Patient Portal offered by [facility] by registering at the following website: [email or web address]. By joining [employer]’s FollowMyHealth portal, you will also be able to view your health information using other applications (apps) compatible with our system.

## 2023-07-15 NOTE — ED PROVIDER NOTE - NSFOLLOWUPINSTRUCTIONS_ED_ALL_ED_FT
Our Emergency Department Referral Coordinators will be reaching out to you in the next 24-48 hours from 9:00am to 5:00pm with a follow up appointment. Please expect a phone call from the hospital in that time frame. If you do not receive a call or if you have any questions or concerns, you can reach them at   [phone]      Achilles Tendinitis  Achilles tendinitis is inflammation of the tough, cord-like band that attaches the lower leg muscles to the heel bone (Achilles tendon). This is usually caused by overusing the tendon and the ankle joint.    ImageAchilles tendinitis usually gets better over time with treatment and caring for yourself at home. It can take weeks or months to heal completely.    What are the causes?  This condition may be caused by:    A sudden increase in exercise or activity, such as running.  Doing the same exercises or activities (such as jumping) over and over.  Not warming up calf muscles before exercising.  Exercising in shoes that are worn out or not made for exercise.  Having arthritis or a bone growth (spur) on the back of the heel bone. This can rub against the tendon and hurt it.  Age-related wear and tear. Tendons become less flexible with age and more likely to be injured.    What are the signs or symptoms?  Common symptoms of this condition include:    Pain in the Achilles tendon or in the back of the leg, just above the heel. The pain usually gets worse with exercise.  Stiffness or soreness in the back of the leg, especially in the morning.  Swelling of the skin over the Achilles tendon.  Thickening of the tendon.  Bone spurs at the bottom of the Achilles tendon, near the heel.  Trouble standing on tiptoe.    How is this diagnosed?  This condition is diagnosed based on your symptoms and a physical exam. You may have tests, including:    X-rays.  MRI.    How is this treated?  The goal of treatment is to relieve symptoms and help your injury heal. Treatment may include:    Decreasing or stopping activities that caused the tendinitis. This may mean switching to low-impact exercises like biking or swimming.  Icing the injured area.  Doing physical therapy, including strengthening and stretching exercises.  NSAIDs to help relieve pain and swelling.  Using supportive shoes, wraps, heel lifts, or a walking boot (air cast).  Surgery. This may be done if your symptoms do not improve after 6 months.  Using high-energy shock wave impulses to stimulate the healing process (extracorporeal shock wave therapy). This is rare.  Injection of medicines to help relieve inflammation (corticosteroids). This is rare.    Follow these instructions at home:  If you have an air cast:     Wear the cast as told by your health care provider. Remove it only as told by your health care provider.  Loosen the cast if your toes tingle, become numb, or turn cold and blue.  Activity     Gradually return to your normal activities once your health care provider approves. Do not do activities that cause pain.    Consider doing low-impact exercises, like cycling or swimming.    If you have an air cast, ask your health care provider when it is safe for you to drive.  If physical therapy was prescribed, do exercises as told by your health care provider or physical therapist.  Managing pain, stiffness, and swelling     Raise (elevate) your foot above the level of your heart while you are sitting or lying down.  Move your toes often to avoid stiffness and to lessen swelling.  ImageIf directed, put ice on the injured area:    Put ice in a plastic bag.  Place a towel between your skin and the bag.  Leave the ice on for 20 minutes, 2–3 times a day    General instructions     If directed, wrap your foot with an elastic bandage or other wrap. This can help keep your tendon from moving too much while it heals. Your health care provider will show you how to wrap your foot correctly.  Wear supportive shoes or heel lifts only as told by your health care provider.  Take over-the-counter and prescription medicines only as told by your health care provider.  Keep all follow-up visits as told by your health care provider. This is important.  Contact a health care provider if:  You have symptoms that gets worse.  You have pain that does not get better with medicine.  You develop new, unexplained symptoms.  You develop warmth and swelling in your foot.  You have a fever.  Get help right away if:  You have a sudden popping sound or sensation in your Achilles tendon followed by severe pain.  You cannot move your toes or foot.  You cannot put any weight on your foot.  Summary  Achilles tendinitis is inflammation of the tough, cord-like band that attaches the lower leg muscles to the heel bone (Achilles tendon).  This condition is usually caused by overusing the tendon and the ankle joint. It can also be caused by arthritis or normal aging.  The most common symptoms of this condition include pain, swelling, or stiffness in the Achilles tendon or in the back of the leg.  This condition is usually treated with rest, NSAIDs, and physical therapy.

## 2023-07-15 NOTE — ED PROVIDER NOTE - ATTENDING APP SHARED VISIT CONTRIBUTION OF CARE
I personally evaluated patient. I agree with the findings and plan with all documentation on chart except as documented  in my note.    73-year-old male past medical history of hypothyroidism, lumbar spine fusion, knee surgery who presents to the emergency department with right heel pain that started this morning.  Patient has nowhere started having sharp pain to his Achilles area on the right and this was worsened with ambulation pain was radiating up his leg.  Patient denies any falls or direct trauma.  Patient not hear any snap or pop.  Patient has not been on antibiotics recently.  No fever or signs of infectious etiology.    On exam, vital signs reviewed.  Patient is nontoxic-appearing.  Patient is neurovascular intact is tenderness to Achilles insertion to right heel.  Padilla test is normal patient has no calf tenderness.  X-rays taken and show no acute issues.  Bedside ultrasound performed and shows intact Achilles tendon with some calcified changes to the insertion site.  Plan made to splint patient in for patient to take anti-inflammatory course.  Will discharge with orthopedics follow-up.  All questions and concerns addressed and patient understands home care.  Patient understands he may require an MRI for further work-up.    Full DC instructions discussed and patient knows when to seek immediate medical attention.  Patient has proper follow up.  All results discussed and patient aware they may require further work up.  Proper follow up ensured. Limitations of ED work up discussed.  Medications administered and prescribed/OTC home meds discussed.  All questions and concerns from patient or family addressed. Understanding of instructions verbalized.

## 2023-07-15 NOTE — ED ADULT TRIAGE NOTE - CHIEF COMPLAINT QUOTE
per pt "I am having this right heel pain that is like a jolt since this morning, and I have been having numbness under the ball of my right foot for a month"

## 2023-07-15 NOTE — ED PROVIDER NOTE - PROGRESS NOTE DETAILS
bedside ultrasound preformed. no rupture of achilles tendon. +spur and calcifications appreciated. will splint and crutch. referral to podiatry

## 2023-07-15 NOTE — ED PROVIDER NOTE - OBJECTIVE STATEMENT
74 y/o male with prior hx of lumbar spine fusion , c/o right heel pain worse this am . patient c/o pain worse with ambulation c/o sharp pain shooting up leg. patient c/o tingling to b/l feet x 1 month. no knee or hip pain . no weakness to extremities. no falls or saddle anesthesia. patient without any swelling or bruising to ankle.

## 2023-07-17 NOTE — CHART NOTE - NSCHARTNOTEFT_GEN_A_CORE
Podiatry follow up scheduled for 7/20 at 815 am with Dr. Vazquez [address] [place] [address] 3rd floor. Pt aware of appt details.

## 2023-07-20 ENCOUNTER — APPOINTMENT (OUTPATIENT)
Dept: PODIATRY | Facility: CLINIC | Age: 73
End: 2023-07-20
Payer: MEDICARE

## 2023-07-20 VITALS
DIASTOLIC BLOOD PRESSURE: 74 MMHG | HEART RATE: 107 BPM | SYSTOLIC BLOOD PRESSURE: 122 MMHG | HEIGHT: 73 IN | WEIGHT: 240 LBS | BODY MASS INDEX: 31.81 KG/M2 | TEMPERATURE: 96.8 F | OXYGEN SATURATION: 96 %

## 2023-07-20 PROCEDURE — 99203 OFFICE O/P NEW LOW 30 MIN: CPT

## 2023-08-08 ENCOUNTER — APPOINTMENT (OUTPATIENT)
Dept: PODIATRY | Facility: CLINIC | Age: 73
End: 2023-08-08
Payer: MEDICARE

## 2023-08-08 PROCEDURE — 99213 OFFICE O/P EST LOW 20 MIN: CPT

## 2023-08-08 NOTE — PHYSICAL EXAM
[Ankle Swelling (On Exam)] : not present [Varicose Veins Of Lower Extremities] : not present [Delayed in the Right Toes] : capillary refills normal in right toes [Delayed in the Left Toes] : capillary refills normal in the left toes [2+] : left foot dorsalis pedis 2+ [No Joint Swelling] : no joint swelling [Normal Foot/Ankle] : Both lower extremities were exposed and visualized. Standing exam demonstrates normal foot posture and alignment. Hindfoot exam shows no hindfoot valgus or varus [Skin Color & Pigmentation] : normal skin color and pigmentation [Skin Turgor] : normal skin turgor [] : no rash [Skin Lesions] : no skin lesions [Foot Ulcer] : no foot ulcer [Skin Induration] : no skin induration [Sensation] : the sensory exam was normal to light touch and pinprick [No Focal Deficits] : no focal deficits [Deep Tendon Reflexes (DTR)] : deep tendon reflexes were 2+ and symmetric [Motor Exam] : the motor exam was normal [Oriented To Time, Place, And Person] : oriented to person, place, and time [Impaired Insight] : insight and judgment were intact [Affect] : the affect was normal

## 2023-08-08 NOTE — PROCEDURE
[FreeTextEntry1] : Patient examined, history and chart reviewed Prescribed meloxicam for Achilles tendinitis and bursitis Patient given prescription for cam boot Patient will follow-up in 4 weeks

## 2023-08-09 NOTE — PROCEDURE
[FreeTextEntry1] : Patient evaluated discussed with patient Achilles tendon bursitis tendinitis patient is aware of condition and requirement of obtaining cam boot patient states he is not currently in pain but will obtain the cam boot and continue treatment will renew meloxicam patient can follow-up in 4 weeks

## 2023-08-09 NOTE — HISTORY OF PRESENT ILLNESS
[FreeTextEntry1] : Patient presents with right Achilles tendon pain patient states he was not able to obtain the cam boot last clinical visit but has been doing the stretching exercises and icing patient also has taken meloxicam 15 mg once a day for the last 3 weeks patient states that the pain has gotten a lot better and yesterday stepped out of bed and noticed that he was not in any discomfort

## 2023-08-17 ENCOUNTER — INPATIENT (INPATIENT)
Facility: HOSPITAL | Age: 73
LOS: 2 days | Discharge: HOME | DRG: 62 | End: 2023-08-20
Admitting: HOSPITALIST
Payer: MEDICARE

## 2023-08-17 VITALS
TEMPERATURE: 99 F | SYSTOLIC BLOOD PRESSURE: 143 MMHG | RESPIRATION RATE: 18 BRPM | WEIGHT: 257.5 LBS | HEART RATE: 92 BPM | DIASTOLIC BLOOD PRESSURE: 89 MMHG | OXYGEN SATURATION: 95 %

## 2023-08-17 DIAGNOSIS — I63.9 CEREBRAL INFARCTION, UNSPECIFIED: ICD-10-CM

## 2023-08-17 DIAGNOSIS — Z96.659 PRESENCE OF UNSPECIFIED ARTIFICIAL KNEE JOINT: Chronic | ICD-10-CM

## 2023-08-17 LAB
ALBUMIN SERPL ELPH-MCNC: 4.3 G/DL — SIGNIFICANT CHANGE UP (ref 3.5–5.2)
ALP SERPL-CCNC: 97 U/L — SIGNIFICANT CHANGE UP (ref 30–115)
ALT FLD-CCNC: 17 U/L — SIGNIFICANT CHANGE UP (ref 0–41)
ANION GAP SERPL CALC-SCNC: 12 MMOL/L — SIGNIFICANT CHANGE UP (ref 7–14)
APTT BLD: 36.2 SEC — SIGNIFICANT CHANGE UP (ref 27–39.2)
AST SERPL-CCNC: 17 U/L — SIGNIFICANT CHANGE UP (ref 0–41)
BASOPHILS # BLD AUTO: 0.07 K/UL — SIGNIFICANT CHANGE UP (ref 0–0.2)
BASOPHILS NFR BLD AUTO: 0.9 % — SIGNIFICANT CHANGE UP (ref 0–1)
BILIRUB SERPL-MCNC: 0.6 MG/DL — SIGNIFICANT CHANGE UP (ref 0.2–1.2)
BUN SERPL-MCNC: 15 MG/DL — SIGNIFICANT CHANGE UP (ref 10–20)
CALCIUM SERPL-MCNC: 9.3 MG/DL — SIGNIFICANT CHANGE UP (ref 8.4–10.5)
CHLORIDE SERPL-SCNC: 105 MMOL/L — SIGNIFICANT CHANGE UP (ref 98–110)
CO2 SERPL-SCNC: 27 MMOL/L — SIGNIFICANT CHANGE UP (ref 17–32)
CREAT SERPL-MCNC: 1.4 MG/DL — SIGNIFICANT CHANGE UP (ref 0.7–1.5)
EGFR: 53 ML/MIN/1.73M2 — LOW
EOSINOPHIL # BLD AUTO: 0.27 K/UL — SIGNIFICANT CHANGE UP (ref 0–0.7)
EOSINOPHIL NFR BLD AUTO: 3.6 % — SIGNIFICANT CHANGE UP (ref 0–8)
GLUCOSE BLDC GLUCOMTR-MCNC: 124 MG/DL — HIGH (ref 70–99)
GLUCOSE SERPL-MCNC: 113 MG/DL — HIGH (ref 70–99)
HCT VFR BLD CALC: 49.7 % — SIGNIFICANT CHANGE UP (ref 42–52)
HGB BLD-MCNC: 16.2 G/DL — SIGNIFICANT CHANGE UP (ref 14–18)
IMM GRANULOCYTES NFR BLD AUTO: 0.1 % — SIGNIFICANT CHANGE UP (ref 0.1–0.3)
INR BLD: 0.93 RATIO — SIGNIFICANT CHANGE UP (ref 0.65–1.3)
LYMPHOCYTES # BLD AUTO: 2.1 K/UL — SIGNIFICANT CHANGE UP (ref 1.2–3.4)
LYMPHOCYTES # BLD AUTO: 28.3 % — SIGNIFICANT CHANGE UP (ref 20.5–51.1)
MCHC RBC-ENTMCNC: 30.4 PG — SIGNIFICANT CHANGE UP (ref 27–31)
MCHC RBC-ENTMCNC: 32.6 G/DL — SIGNIFICANT CHANGE UP (ref 32–37)
MCV RBC AUTO: 93.2 FL — SIGNIFICANT CHANGE UP (ref 80–94)
MONOCYTES # BLD AUTO: 0.75 K/UL — HIGH (ref 0.1–0.6)
MONOCYTES NFR BLD AUTO: 10.1 % — HIGH (ref 1.7–9.3)
NEUTROPHILS # BLD AUTO: 4.23 K/UL — SIGNIFICANT CHANGE UP (ref 1.4–6.5)
NEUTROPHILS NFR BLD AUTO: 57 % — SIGNIFICANT CHANGE UP (ref 42.2–75.2)
NRBC # BLD: 0 /100 WBCS — SIGNIFICANT CHANGE UP (ref 0–0)
PLATELET # BLD AUTO: 291 K/UL — SIGNIFICANT CHANGE UP (ref 130–400)
PMV BLD: 9.1 FL — SIGNIFICANT CHANGE UP (ref 7.4–10.4)
POTASSIUM SERPL-MCNC: 4.1 MMOL/L — SIGNIFICANT CHANGE UP (ref 3.5–5)
POTASSIUM SERPL-SCNC: 4.1 MMOL/L — SIGNIFICANT CHANGE UP (ref 3.5–5)
PROT SERPL-MCNC: 6.8 G/DL — SIGNIFICANT CHANGE UP (ref 6–8)
PROTHROM AB SERPL-ACNC: 10.6 SEC — SIGNIFICANT CHANGE UP (ref 9.95–12.87)
RBC # BLD: 5.33 M/UL — SIGNIFICANT CHANGE UP (ref 4.7–6.1)
RBC # FLD: 13.2 % — SIGNIFICANT CHANGE UP (ref 11.5–14.5)
SODIUM SERPL-SCNC: 144 MMOL/L — SIGNIFICANT CHANGE UP (ref 135–146)
TROPONIN T SERPL-MCNC: <0.01 NG/ML — SIGNIFICANT CHANGE UP
WBC # BLD: 7.43 K/UL — SIGNIFICANT CHANGE UP (ref 4.8–10.8)
WBC # FLD AUTO: 7.43 K/UL — SIGNIFICANT CHANGE UP (ref 4.8–10.8)

## 2023-08-17 PROCEDURE — 36415 COLL VENOUS BLD VENIPUNCTURE: CPT

## 2023-08-17 PROCEDURE — 99291 CRITICAL CARE FIRST HOUR: CPT | Mod: FS

## 2023-08-17 PROCEDURE — 71045 X-RAY EXAM CHEST 1 VIEW: CPT | Mod: 26

## 2023-08-17 PROCEDURE — 80053 COMPREHEN METABOLIC PANEL: CPT

## 2023-08-17 PROCEDURE — 70498 CT ANGIOGRAPHY NECK: CPT | Mod: 26,MA

## 2023-08-17 PROCEDURE — 70450 CT HEAD/BRAIN W/O DYE: CPT | Mod: 26,XU,MA

## 2023-08-17 PROCEDURE — 70450 CT HEAD/BRAIN W/O DYE: CPT

## 2023-08-17 PROCEDURE — 70551 MRI BRAIN STEM W/O DYE: CPT

## 2023-08-17 PROCEDURE — 83735 ASSAY OF MAGNESIUM: CPT

## 2023-08-17 PROCEDURE — 70545 MR ANGIOGRAPHY HEAD W/DYE: CPT

## 2023-08-17 PROCEDURE — 0042T: CPT | Mod: MA

## 2023-08-17 PROCEDURE — 93005 ELECTROCARDIOGRAM TRACING: CPT

## 2023-08-17 PROCEDURE — 85025 COMPLETE CBC W/AUTO DIFF WBC: CPT

## 2023-08-17 PROCEDURE — A9579: CPT

## 2023-08-17 PROCEDURE — 97162 PT EVAL MOD COMPLEX 30 MIN: CPT | Mod: GP

## 2023-08-17 PROCEDURE — 93306 TTE W/DOPPLER COMPLETE: CPT | Mod: 26

## 2023-08-17 PROCEDURE — 93306 TTE W/DOPPLER COMPLETE: CPT

## 2023-08-17 PROCEDURE — 86803 HEPATITIS C AB TEST: CPT

## 2023-08-17 PROCEDURE — 80048 BASIC METABOLIC PNL TOTAL CA: CPT

## 2023-08-17 PROCEDURE — 87040 BLOOD CULTURE FOR BACTERIA: CPT

## 2023-08-17 PROCEDURE — 92610 EVALUATE SWALLOWING FUNCTION: CPT | Mod: GN

## 2023-08-17 PROCEDURE — 85027 COMPLETE CBC AUTOMATED: CPT

## 2023-08-17 PROCEDURE — 97165 OT EVAL LOW COMPLEX 30 MIN: CPT | Mod: GO

## 2023-08-17 PROCEDURE — 70496 CT ANGIOGRAPHY HEAD: CPT | Mod: 26,MA

## 2023-08-17 RX ORDER — SODIUM CHLORIDE 9 MG/ML
10 INJECTION INTRAMUSCULAR; INTRAVENOUS; SUBCUTANEOUS ONCE
Refills: 0 | Status: COMPLETED | OUTPATIENT
Start: 2023-08-17 | End: 2023-08-17

## 2023-08-17 RX ORDER — LEVOTHYROXINE SODIUM 125 MCG
250 TABLET ORAL DAILY
Refills: 0 | Status: DISCONTINUED | OUTPATIENT
Start: 2023-08-17 | End: 2023-08-20

## 2023-08-17 RX ORDER — ATORVASTATIN CALCIUM 80 MG/1
80 TABLET, FILM COATED ORAL AT BEDTIME
Refills: 0 | Status: DISCONTINUED | OUTPATIENT
Start: 2023-08-17 | End: 2023-08-20

## 2023-08-17 RX ORDER — CHLORHEXIDINE GLUCONATE 213 G/1000ML
1 SOLUTION TOPICAL
Refills: 0 | Status: DISCONTINUED | OUTPATIENT
Start: 2023-08-17 | End: 2023-08-20

## 2023-08-17 RX ORDER — LEVOTHYROXINE SODIUM 125 MCG
1 TABLET ORAL
Qty: 0 | Refills: 0 | DISCHARGE

## 2023-08-17 RX ORDER — SODIUM CHLORIDE 9 MG/ML
10 INJECTION INTRAMUSCULAR; INTRAVENOUS; SUBCUTANEOUS ONCE
Refills: 0 | Status: DISCONTINUED | OUTPATIENT
Start: 2023-08-17 | End: 2023-08-20

## 2023-08-17 RX ORDER — TENECTEPLASE 50 MG
25 KIT INTRAVENOUS ONCE
Refills: 0 | Status: COMPLETED | OUTPATIENT
Start: 2023-08-17 | End: 2023-08-17

## 2023-08-17 RX ADMIN — TENECTEPLASE 3600 MILLIGRAM(S): KIT at 13:22

## 2023-08-17 RX ADMIN — SODIUM CHLORIDE 10 MILLILITER(S): 9 INJECTION INTRAMUSCULAR; INTRAVENOUS; SUBCUTANEOUS at 13:22

## 2023-08-17 RX ADMIN — SODIUM CHLORIDE 10 MILLILITER(S): 9 INJECTION INTRAMUSCULAR; INTRAVENOUS; SUBCUTANEOUS at 14:30

## 2023-08-17 NOTE — ED PROVIDER NOTE - PHYSICAL EXAMINATION
CONST: NAD  EYES: PERRL, EOMI, Sclera and conjunctiva clear.   ENT: No nasal discharge. Oropharynx normal appearing, no erythema or exudates. No abscess or swelling. Uvula midline.   NECK: Non-tender, no meningeal signs. normal ROM. supple   CARD: S1 S2; No jvd  RESP: Equal BS B/L, No wheezes, rhonchi or rales. No distress  GI: Soft, non-tender, non-distended. normal BS  MS: Normal ROM in all extremities. pulses 2 +. no calf tenderness or swelling  SKIN: Warm, dry, no acute rashes. Good turgor  NEURO: A&Ox3, No focal deficits. LLE Strength 4/5. No sensory deficits. Steady gait. Finger to nose intact. Negative pronator drift

## 2023-08-17 NOTE — H&P ADULT - ASSESSMENT
73  years old male with hx hypothyroid paroxysmal afib off eliquis present for left sided weakness. LKW 1 hour prior to arrival. NIHSS 2 as per ED physician. Pt has h/o afib, used to be on eliquis but was switched to ASA monotherapy some time ago.      In the ED vitals stable.   Labs unremarkable.   CTH: No acute territorial infarct, intracranial hemorrhage, or midline shift. Stable moderate chronic microvascular ischemic changes.  CTP: No perfusion deficits to suggest areas of completed infarction or at risk territory.  CTA: Moderate focal stenosis in the mid right M1 MCA. Additional irregular stenosis in the M3 branches of the inferior division of the right MCA, and M2/M3 branches of the superior division of the left MCA. Irregular stenosis in the distal branches of the bilateral PCAs (P3/P4). Asymmetric nonopacification of the distal left transverse sinus likely due to slow flow. Confirmation can be considered with CTV or MRV. Mild atherosclerosis stenosis in bilateral proximal cervical ICA. No large vessel occlusion, aneurysm, or vascular malformation.   TNK administered in ED. Telestroke following. Plan to Maintain BP <180/105 mmHg; -180, SCD for DVT ppx, No ATP/AC until 24 hr f/u CT head without ICH, STAT CT head for any neuro decline.      73  years old male with hx hypothyroid paroxysmal afib off eliquis present for left sided weakness s/p TNK with improvement of symptoms     #Acute CVA s/p TNK  -In the ED vitals stable.   -Labs unremarkable.   -CTH: No acute territorial infarct, intracranial hemorrhage, or midline shift. Stable moderate chronic microvascular ischemic changes.  -CTP: No perfusion deficits to suggest areas of completed infarction or at risk territory.  -CTA: Moderate focal stenosis in the mid right M1 MCA. Additional irregular stenosis in the M3 branches of the inferior division of the right MCA, and M2/M3 branches of the superior division of the left MCA. Irregular stenosis in the distal branches of the bilateral PCAs (P3/P4). Asymmetric nonopacification of the distal left transverse sinus likely due to slow flow. Confirmation can be considered with CTV or MRV. Mild atherosclerosis stenosis in bilateral proximal cervical ICA. No large vessel occlusion, aneurysm, or vascular malformation.   - Maintain BP <180/105 mmHg; -180,   - SCD for DVT ppx,   - No ATP/AC until 24 hr f/u CT head without ICH,   - STAT CT head for any neuro decline   - Q1 neuro checks     #paroxsymal Afib   - is off eliquis  - should restart metoprolol  - plan to resume eliquis on DC     #hypothyroid- c/w levothyroxine 250mcg. Repeat TSH    #Misc  - DVT Prophylaxis: hold AC, SCD   - Diet:DASH  - Activity:bedrest   - Code Status: FULL CODE   73  years old male with hx hypothyroid paroxysmal afib off eliquis present for left sided weakness s/p TNK with improvement of symptoms     #Acute CVA s/p TNK  -In the ED vitals stable.   -Labs unremarkable.   -CTH: No acute territorial infarct, intracranial hemorrhage, or midline shift. Stable moderate chronic microvascular ischemic changes.  -CTP: No perfusion deficits to suggest areas of completed infarction or at risk territory.  -CTA: Moderate focal stenosis in the mid right M1 MCA. Additional irregular stenosis in the M3 branches of the inferior division of the right MCA, and M2/M3 branches of the superior division of the left MCA. Irregular stenosis in the distal branches of the bilateral PCAs (P3/P4). Asymmetric nonopacification of the distal left transverse sinus likely due to slow flow. Confirmation can be considered with CTV or MRV. Mild atherosclerosis stenosis in bilateral proximal cervical ICA. No large vessel occlusion, aneurysm, or vascular malformation.   - Maintain BP <180/105 mmHg; -180,   - SCD for DVT ppx,   - No ATP/AC until 24 hr f/u CT head without ICH  - STAT CT head for any neuro decline   - Q1 neuro checks     #paroxsymal Afib   - EKG shows NSR   - is off eliquis  - plan to resume eliquis on DC     #hypothyroid- c/w levothyroxine 250mcg. Repeat TSH    #Misc  - DVT Prophylaxis: hold AC, SCD   - Diet:DASH  - Activity:bedrest   - Code Status: FULL CODE   73  years old male with hx hypothyroid paroxysmal afib off eliquis present for left sided weakness s/p TNK with improvement of symptoms     #Acute CVA s/p TNK  -In the ED vitals stable.   -Labs unremarkable.   -CTH: No acute territorial infarct, intracranial hemorrhage, or midline shift. Stable moderate chronic microvascular ischemic changes.  -CTP: No perfusion deficits to suggest areas of completed infarction or at risk territory.  -CTA: Moderate focal stenosis in the mid right M1 MCA. Additional irregular stenosis in the M3 branches of the inferior division of the right MCA, and M2/M3 branches of the superior division of the left MCA. Irregular stenosis in the distal branches of the bilateral PCAs (P3/P4). Asymmetric nonopacification of the distal left transverse sinus likely due to slow flow. Confirmation can be considered with CTV or MRV. Mild atherosclerosis stenosis in bilateral proximal cervical ICA. No large vessel occlusion, aneurysm, or vascular malformation.   - Maintain BP <180/105 mmHg; -180,   - SCD for DVT ppx,   - No ATP/AC until 24 hr f/u CT head without ICH  - STAT CT head for any neuro decline   - Q1 neuro checks   - fu echo     #paroxsymal Afib   - EKG shows NSR   - is off eliquis  - plan to resume eliquis on DC     #hypothyroid- c/w levothyroxine 250mcg. Repeat TSH    #Misc  - DVT Prophylaxis: hold AC, SCD   - Diet:DASH  - Activity:bedrest   - Code Status: FULL CODE

## 2023-08-17 NOTE — H&P ADULT - NSHPPHYSICALEXAM_GEN_ALL_CORE
VITALS:   T(C): 36 (08-17-23 @ 14:25), Max: 37.2 (08-17-23 @ 12:43)  HR: 76 (08-17-23 @ 14:30) (76 - 93)  BP: 149/88 (08-17-23 @ 14:30) (139/84 - 173/101)  RR: 21 (08-17-23 @ 14:30) (18 - 21)  SpO2: 95% (08-17-23 @ 14:30) (94% - 98%)    GENERAL: NAD, lying in bed comfortably  HEAD:  Atraumatic, normocephalic  EYES: EOMI, PERRLA, conjunctiva and sclera clear  ENT: Moist mucous membranes  NECK: Supple, no JVD  HEART: Regular rate and rhythm, no murmurs, rubs, or gallops  LUNGS: Unlabored respirations.  Clear to auscultation bilaterally, no crackles, wheezing, or rhonchi  ABDOMEN: Soft, nontender, nondistended, +BS  EXTREMITIES: 2+ peripheral pulses bilaterally. No clubbing, cyanosis, or edema  NERVOUS SYSTEM:  A&Ox3, no focal deficits   SKIN: No rashes or lesions

## 2023-08-17 NOTE — H&P ADULT - NSHPREVIEWOFSYSTEMS_GEN_ALL_CORE
REVIEW OF SYSTEMS:    CONSTITUTIONAL: No fever, weight loss, or fatigue  EYES: No eye pain, visual disturbances, or discharge  ENMT:  No difficulty hearing, tinnitus, vertigo; No sinus or throat pain  NECK: No pain or stiffness  BREASTS: No pain, masses, or nipple discharge  RESPIRATORY: No cough, wheezing, chills or hemoptysis; No shortness of breath  CARDIOVASCULAR: No chest pain, palpitations, dizziness, or leg swelling  GASTROINTESTINAL: No abdominal or epigastric pain. No nausea, vomiting, or hematemesis; No diarrhea or constipation. No melena or hematochezia.  GENITOURINARY: No dysuria, frequency, hematuria, or incontinence  NEUROLOGICAL: No headaches, memory loss, loss of strength, numbness, or tremors  SKIN: No itching, burning, rashes, or lesions   LYMPH NODES: No enlarged glands  ENDOCRINE: No heat or cold intolerance; No hair loss  MUSCULOSKELETAL: No joint pain or swelling; No muscle, back, or extremity pain  PSYCHIATRIC: No depression, anxiety, mood swings, or difficulty sleeping  HEME/LYMPH: No easy bruising, or bleeding gums  ALLERGY AND IMMUNOLOGIC: No hives or eczema REVIEW OF SYSTEMS:    CONSTITUTIONAL: No fever, weight loss, or fatigue  EYES: No eye pain, visual disturbances, or discharge  ENMT:  No difficulty hearing, tinnitus, vertigo; No sinus or throat pain  NECK: No pain or stiffness  BREASTS: No pain, masses, or nipple discharge  RESPIRATORY: No cough, wheezing, chills or hemoptysis; No shortness of breath  CARDIOVASCULAR: No chest pain, palpitations, dizziness, or leg swelling  GASTROINTESTINAL: No abdominal or epigastric pain. No nausea, vomiting, or hematemesis; No diarrhea or constipation. No melena or hematochezia.  GENITOURINARY: No dysuria, frequency, hematuria, or incontinence  NEUROLOGICAL: see hpi   SKIN: No itching, burning, rashes, or lesions   LYMPH NODES: No enlarged glands  ENDOCRINE: No heat or cold intolerance; No hair loss  MUSCULOSKELETAL: No joint pain or swelling; No muscle, back, or extremity pain  PSYCHIATRIC: No depression, anxiety, mood swings, or difficulty sleeping  HEME/LYMPH: No easy bruising, or bleeding gums  ALLERGY AND IMMUNOLOGIC: No hives or eczema

## 2023-08-17 NOTE — ED ADULT NURSE NOTE - CODE STROKE ACTIVE YN
(delayed entry)     Patient called back end of day yesterday  Results were given  Patient verbalized understanding  Will go have labs drawn again on Thursday 
Patient is at [facility] ED  Dr Guadalupe Scott is aware      (Patient has colorectal surgeon already Dr Joyce Duff group - if anything is needed )
Patient was admitted to [facility]   Is scheduled to have EGD/[place] today by GI     (no issues regarding recent hernia repair)
Per Dr Rajinder Sharp received stat bloodwork results  Hemoglobin within normal range which is what she was concerned about  Will retest in 48 hours  Patient already has script 
Received call from patient  She states that she'll be going to ED at [facility]  She had the same "bleeding" episodes as yesterday  I informed patient that I'd make provider aware and that we would track her through the computer 
Yes

## 2023-08-17 NOTE — H&P ADULT - HISTORY OF PRESENT ILLNESS
73  years old male with hx hypothyroid paroxysmal afib off eliquis present for left sided weakness. LKW 1 hour prior to arrival. NIHSS 2 as per ED physician. Pt has h/o afib, used to be on eliquis but was switched to ASA monotherapy some time ago.      In the ED vitals stable.   Labs unremarkable.   CTH: No acute territorial infarct, intracranial hemorrhage, or midline shift. Stable moderate chronic microvascular ischemic changes.  CTP: No perfusion deficits to suggest areas of completed infarction or at risk territory.  CTA: Moderate focal stenosis in the mid right M1 MCA. Additional irregular stenosis in the M3 branches of the inferior division of the right MCA, and M2/M3 branches of the superior division of the left MCA. Irregular stenosis in the distal branches of the bilateral PCAs (P3/P4). Asymmetric nonopacification of the distal left transverse sinus likely due to slow flow. Confirmation can be considered with CTV or MRV. Mild atherosclerosis stenosis in bilateral proximal cervical ICA. No large vessel occlusion, aneurysm, or vascular malformation.   TNK administered in ED. Telestroke following. Plan to Maintain BP <180/105 mmHg; -180, SCD for DVT ppx, No ATP/AC until 24 hr f/u CT head without ICH, STAT CT head for any neuro decline.    73  years old male with hx hypothyroid paroxysmal afib off eliquis present for left sided weakness. LKW 1 hour prior to arrival. NIHSS 2 as per ED physician. Was having breakfast when he felt "off". He began to notice his L hand cramping, and then his L arm dropped. He tried to get up his L leg was weak and felt like he was going to fall. Denies palpitations, headache, visual changes, SOB, chest pain, N/V, diarrhea.  Pt had a new diagnosis of afib 1 year ago, he was sent home on eliquis which he took for 1 month but was switched to ASA monotherapy after discussing with his doctor.       In the ED vitals stable.   Labs unremarkable.   CTH: No acute territorial infarct, intracranial hemorrhage, or midline shift. Stable moderate chronic microvascular ischemic changes.  CTP: No perfusion deficits to suggest areas of completed infarction or at risk territory.  CTA: Moderate focal stenosis in the mid right M1 MCA. Additional irregular stenosis in the M3 branches of the inferior division of the right MCA, and M2/M3 branches of the superior division of the left MCA. Irregular stenosis in the distal branches of the bilateral PCAs (P3/P4). Asymmetric nonopacification of the distal left transverse sinus likely due to slow flow. Confirmation can be considered with CTV or MRV. Mild atherosclerosis stenosis in bilateral proximal cervical ICA. No large vessel occlusion, aneurysm, or vascular malformation.   TNK administered in ED.  Patient has had interval resolution of symptoms. NO longer has weakness in L side and not feeling "off". Telestroke following. Plan to Maintain BP <180/105 mmHg; -180, SCD for DVT ppx, No ATP/AC until 24 hr f/u CT head without ICH, STAT CT head for any neuro decline.    73  years old male with hx hypothyroid paroxysmal afib off eliquis present for left sided weakness. LKW 1-2 hours prior to arrival. NIHSS 2 as per ED physician. Was having breakfast when he felt "off". He began to notice his L hand cramping, and then his L arm dropped. He tried to get up his L leg was weak and felt like he was going to fall. Denies palpitations, headache, visual changes, SOB, chest pain, N/V, diarrhea.  Pt had a new diagnosis of afib 1 year ago, he was sent home on eliquis which he took for 1 month but was switched to ASA monotherapy after discussing with his doctor.       In the ED vitals stable.   Labs unremarkable.   EKG NSR  CTH: No acute territorial infarct, intracranial hemorrhage, or midline shift. Stable moderate chronic microvascular ischemic changes.  CTP: No perfusion deficits to suggest areas of completed infarction or at risk territory.  CTA: Moderate focal stenosis in the mid right M1 MCA. Additional irregular stenosis in the M3 branches of the inferior division of the right MCA, and M2/M3 branches of the superior division of the left MCA. Irregular stenosis in the distal branches of the bilateral PCAs (P3/P4). Asymmetric nonopacification of the distal left transverse sinus likely due to slow flow. Confirmation can be considered with CTV or MRV. Mild atherosclerosis stenosis in bilateral proximal cervical ICA. No large vessel occlusion, aneurysm, or vascular malformation.   TNK administered in ED.  Patient has had interval resolution of symptoms. NO longer has weakness in L side and not feeling "off". Telestroke following. Plan to Maintain BP <180/105 mmHg; -180, SCD for DVT ppx, No ATP/AC until 24 hr f/u CT head without ICH, STAT CT head for any neuro decline.

## 2023-08-17 NOTE — PATIENT PROFILE ADULT - FALL HARM RISK - HARM RISK INTERVENTIONS

## 2023-08-17 NOTE — ED ADULT TRIAGE NOTE - CHIEF COMPLAINT QUOTE
BIBA via [facility] from the [facility],  states he had left sided facial droop and left sided arm weakness and cramping in finger, stroke code called pre arrival at 12:37

## 2023-08-17 NOTE — ED ADULT NURSE NOTE - CHIEF COMPLAINT QUOTE
BIBA via [facility] from the [facility],  states he had left sided facial droop and left sided arm weakness and cramping in finger, stroke code called pre arrival at 12:37

## 2023-08-17 NOTE — ED PROVIDER NOTE - OBJECTIVE STATEMENT
73-year-old male past medical history of lumbar fusion, hypothyroid, A-fib on aspirin presents for evaluation of stroke.  Patient states he was eating @1200 when he developed a cramping sensation in his left arm and followed by weakness in his left arm falling onto his side.  Patient went to stand up and developed weakness in his left lower extremity. Has not been able to ambulate since. Now presents with improvement of LUE weakness. Denies fever, ha, cp, sob, numbness, dysuria, hematuria, n/v/d/c

## 2023-08-17 NOTE — ED ADULT NURSE NOTE - NSFALLHARMRISKINTERV_ED_ALL_ED

## 2023-08-17 NOTE — ED PROVIDER NOTE - CRITICAL CARE ATTENDING CONTRIBUTION TO CARE
73 y.o. male, PMH of hypothyroidism, paroxysmal afib on ASA, present for left sided weakness. Symptoms started at 12pm when he was about to have breakfast. Pt began to notice his L hand cramping, and then his L arm dropped. He tried to get up but his L leg was weak and felt like he was going to fall. Denies palpitations, headache, visual changes, SOB, chest pain, N/V, diarrhea.  Pt had a new diagnosis of afib 1 year ago, he was sent home on eliquis which he took for 1 month but was switched to ASA monotherapy after discussing with his doctor.  On exam, pt in NAD, AAOx3, head NC/AT, CN II-XII intact, PEERL, EOMi, neck (-) midline tenderness, lungs CTA B/L, CV S1S2 regular, abdomen soft/NT/ND/(+)BS, ext (-) edema, motor 5/5 on right side, 4/5 on left side, sensation intact, finger to nose intact, unable to ambulate due to left leg weakness.     Code stroke called in the ED.     TNK administered.     Will admit to ICU for monitoring.

## 2023-08-17 NOTE — H&P ADULT - NSHPLABSRESULTS_GEN_ALL_CORE
LABS/RADIOLOGY RESULTS:                          16.2   7.43  )-----------( 291      ( 17 Aug 2023 12:44 )             49.7   08-17    144  |  105  |  15  ----------------------------<  113<H>  4.1   |  27  |  1.4    Ca    9.3      17 Aug 2023 12:44    TPro  6.8  /  Alb  4.3  /  TBili  0.6  /  DBili  x   /  AST  17  /  ALT  17  /  AlkPhos  97  08-17  PT/INR - ( 17 Aug 2023 12:44 )   PT: 10.60 sec;   INR: 0.93 ratio         PTT - ( 17 Aug 2023 12:44 )  PTT:36.2 secBlood Cultures    Urinalysis Basic - ( 17 Aug 2023 12:44 )    Color:  / Appearance:  / SG:  / pH:   Gluc: 113 mg/dL / Ketone:   / Bili:  / Urobili:    Blood:  / Protein:  / Nitrite:    Leuk Esterase:  / RBC:  / WBC    Sq Epi:  / Non Sq Epi:  / Bacteria:

## 2023-08-17 NOTE — ED ADULT NURSE NOTE - NSFALLFACTORS_ED_ALL_ED
Weakness

## 2023-08-18 LAB
HCV AB S/CO SERPL IA: 0.04 COI — SIGNIFICANT CHANGE UP
HCV AB SERPL-IMP: SIGNIFICANT CHANGE UP

## 2023-08-18 PROCEDURE — 93010 ELECTROCARDIOGRAM REPORT: CPT

## 2023-08-18 PROCEDURE — 99223 1ST HOSP IP/OBS HIGH 75: CPT

## 2023-08-18 PROCEDURE — 99222 1ST HOSP IP/OBS MODERATE 55: CPT | Mod: GC

## 2023-08-18 PROCEDURE — 70450 CT HEAD/BRAIN W/O DYE: CPT | Mod: 26

## 2023-08-18 RX ORDER — SODIUM CHLORIDE 9 MG/ML
1000 INJECTION INTRAMUSCULAR; INTRAVENOUS; SUBCUTANEOUS ONCE
Refills: 0 | Status: COMPLETED | OUTPATIENT
Start: 2023-08-18 | End: 2023-08-18

## 2023-08-18 RX ORDER — SODIUM CHLORIDE 9 MG/ML
500 INJECTION INTRAMUSCULAR; INTRAVENOUS; SUBCUTANEOUS ONCE
Refills: 0 | Status: COMPLETED | OUTPATIENT
Start: 2023-08-18 | End: 2023-08-18

## 2023-08-18 RX ORDER — ASPIRIN/CALCIUM CARB/MAGNESIUM 324 MG
81 TABLET ORAL DAILY
Refills: 0 | Status: DISCONTINUED | OUTPATIENT
Start: 2023-08-18 | End: 2023-08-20

## 2023-08-18 RX ADMIN — ATORVASTATIN CALCIUM 80 MILLIGRAM(S): 80 TABLET, FILM COATED ORAL at 22:50

## 2023-08-18 RX ADMIN — CHLORHEXIDINE GLUCONATE 1 APPLICATION(S): 213 SOLUTION TOPICAL at 06:13

## 2023-08-18 RX ADMIN — Medication 250 MICROGRAM(S): at 06:08

## 2023-08-18 RX ADMIN — Medication 81 MILLIGRAM(S): at 18:20

## 2023-08-18 RX ADMIN — SODIUM CHLORIDE 500 MILLILITER(S): 9 INJECTION INTRAMUSCULAR; INTRAVENOUS; SUBCUTANEOUS at 06:05

## 2023-08-18 RX ADMIN — SODIUM CHLORIDE 1000 MILLILITER(S): 9 INJECTION INTRAMUSCULAR; INTRAVENOUS; SUBCUTANEOUS at 08:10

## 2023-08-18 NOTE — OCCUPATIONAL THERAPY INITIAL EVALUATION ADULT - LIVES WITH, PROFILE
wife and son in a private home +3 steps to enter with bilateral handrails +1 flight of steps with right handrail to bedroom/den/living room +tub +shower chair +suction cup +standard toilet with vanity within reach

## 2023-08-18 NOTE — SWALLOW BEDSIDE ASSESSMENT ADULT - SLP PERTINENT HISTORY OF CURRENT PROBLEM
73  years old male with hx hypothyroid paroxysmal afib off eliquis present for left sided weakness. LKW 1-2 hours prior to arrival. NIHSS 2 as per ED physician. Was having breakfast when he felt "off". He began to notice his L hand cramping, and then his L arm dropped. He tried to get up his L leg was weak and felt like he was going to fall. Denies palpitations, headache, visual changes, SOB, chest pain, N/V, diarrhea.  Pt had a new diagnosis of afib 1 year ago, he was sent home on eliquis which he took for 1 month but was switched to ASA monotherapy after discussing with his doctor.TNK administered

## 2023-08-18 NOTE — CONSULT NOTE ADULT - SUBJECTIVE AND OBJECTIVE BOX
Neurology Consult    Patient is a 73y old  Male who presents with a chief complaint of r/o cva (18 Aug 2023 07:28)    HPI:  73  years old male with hx hypothyroid paroxysmal afib recently switched off eliquis currently p/w acute onset L sided weakness around 12PM yesterday persistent symptoms.  On arrival NIHSS 2 per ED and pt evaluated by telestroke with no contraindications for IV thrombolysis s/p IV TNK.  CTA w/ R distal M2 stenosis but no LVO.  Pt admitted for post-thrombolysis care.  Of note, pt had a new diagnosis of afib 1 year ago, he was sent home on eliquis which he took for 1 month but was switched to ASA monotherapy for unclear reasons.     In the ED vitals stable.   Labs unremarkable.   EKG NSR  CTH: No acute territorial infarct, intracranial hemorrhage, or midline shift. Stable moderate chronic microvascular ischemic changes.  CTP: No perfusion deficits to suggest areas of completed infarction or at risk territory.  CTA: Moderate focal stenosis in the mid right M1 MCA. Additional irregular stenosis in the M3 branches of the inferior division of the right MCA, and M2/M3 branches of the superior division of the left MCA. Irregular stenosis in the distal branches of the bilateral PCAs (P3/P4). Asymmetric nonopacification of the distal left transverse sinus likely due to slow flow. Confirmation can be considered with CTV or MRV. Mild atherosclerosis stenosis in bilateral proximal cervical ICA. No large vessel occlusion, aneurysm, or vascular malformation.   TNK administered in ED.  Patient has had interval resolution of symptoms. NO longer has weakness in L side and not feeling "off". Telestroke following. Plan to Maintain BP <180/105 mmHg; -180, SCD for DVT ppx, No ATP/AC until 24 hr f/u CT head without ICH, STAT CT head for any neuro decline.     PAST MEDICAL & SURGICAL HISTORY:  Hypothyroid    History of total knee replacement (TKR)  left    FAMILY HISTORY:  No pertinent family history in first degree relatives    Social History: (-) x 3    Allergies    No Known Allergies    Intolerances    MEDICATIONS  (STANDING):  atorvastatin 80 milliGRAM(s) Oral at bedtime  chlorhexidine 2% Cloths 1 Application(s) Topical <User Schedule>  levothyroxine 250 MICROGram(s) Oral daily  sodium chloride 0.9% Bolus 1000 milliLiter(s) IV Bolus once  sodium chloride 0.9% lock flush 10 milliLiter(s) IV Push once    MEDICATIONS  (PRN):    Review of systems:    Constitutional: as per HPI  Eyes: No eye pain or discharge  ENMT:  No difficulty hearing; No sinus or throat pain  Neck: No pain or stiffness  Respiratory: No cough, wheezing, chills or hemoptysis  Cardiovascular: No chest pain, palpitations, shortness of breath, dyspnea on exertion  Gastrointestinal: No abdominal pain, nausea, vomiting or hematemesis; No diarrhea or constipation.   Genitourinary: No dysuria, frequency, hematuria or incontinence  Neurological: As per HPI  Skin: No rashes or lesions   Endocrine: No heat or cold intolerance; No hair loss  Musculoskeletal: No joint pain or swelling  Psychiatric: No depression, anxiety, mood swings  Heme/Lymph: No easy bruising or bleeding gums    Vital Signs Last 24 Hrs  T(C): 35.8 (18 Aug 2023 07:01), Max: 37.2 (17 Aug 2023 12:43)  T(F): 96.5 (18 Aug 2023 07:01), Max: 98.9 (17 Aug 2023 12:43)  HR: 76 (18 Aug 2023 08:05) (66 - 93)  BP: 102/66 (18 Aug 2023 08:05) (102/66 - 173/101)  BP(mean): 78 (18 Aug 2023 08:05) (78 - 130)  RR: 18 (18 Aug 2023 08:05) (14 - 37)  SpO2: 91% (18 Aug 2023 08:05) (89% - 98%)    Parameters below as of 18 Aug 2023 07:22  Patient On (Oxygen Delivery Method): room air    Examination:  General:  Appearance is consistent with chronologic age.  No abnormal facies.  Gross skin survey within normal limits.    Cognitive/Language:  The patient is oriented to person, place, time and date.  Recent and remote memory intact.  Fund of knowledge is intact and normal.  Language with normal repetition, comprehension and naming.  Nondysarthric.    Eyes: intact VA, VFF.  EOMI w/o nystagmus, skew or reported double vision.  PERRL.  No ptosis/weakness of eyelid closure.    Face:  Facial sensation normal V1 - 3, no facial asymmetry.    Ears/Nose/Throat:  Hearing grossly intact b/l.  Palate elevates midline.  Tongue and uvula midline.   Motor examination:   Normal tone, bulk and range of motion.  No tenderness, twitching, tremors or involuntary movements.  Formal Muscle Strength Testing: (MRC grade R/L) 5/5 UE; 5/5 LE.  No observable drift.  Reflexes:   2+ b/l pectoralis, biceps, triceps, brachioradialis, patella and Achilles.  Plantar response downgoing b/l.  Jaw jerk, Koko, clonus absent.  Sensory examination:   Intact to light touch and pinprick, pain, temperature and proprioception and vibration in all extremities.  Cerebellum:   FTN/HKS intact with normal NAFISA in all limbs.  No dysmetria or dysdiadokinesia.  Gait narrow based and normal.    Labs:   CBC Full  -  ( 17 Aug 2023 12:44 )  WBC Count : 7.43 K/uL  RBC Count : 5.33 M/uL  Hemoglobin : 16.2 g/dL  Hematocrit : 49.7 %  Platelet Count - Automated : 291 K/uL  Mean Cell Volume : 93.2 fL  Mean Cell Hemoglobin : 30.4 pg  Mean Cell Hemoglobin Concentration : 32.6 g/dL  Auto Neutrophil # : 4.23 K/uL  Auto Lymphocyte # : 2.10 K/uL  Auto Monocyte # : 0.75 K/uL  Auto Eosinophil # : 0.27 K/uL  Auto Basophil # : 0.07 K/uL  Auto Neutrophil % : 57.0 %  Auto Lymphocyte % : 28.3 %  Auto Monocyte % : 10.1 %  Auto Eosinophil % : 3.6 %  Auto Basophil % : 0.9 %    08-17    144  |  105  |  15  ----------------------------<  113<H>  4.1   |  27  |  1.4    Ca    9.3      17 Aug 2023 12:44    TPro  6.8  /  Alb  4.3  /  TBili  0.6  /  DBili  x   /  AST  17  /  ALT  17  /  AlkPhos  97  08-17    LIVER FUNCTIONS - ( 17 Aug 2023 12:44 )  Alb: 4.3 g/dL / Pro: 6.8 g/dL / ALK PHOS: 97 U/L / ALT: 17 U/L / AST: 17 U/L / GGT: x           PT/INR - ( 17 Aug 2023 12:44 )   PT: 10.60 sec;   INR: 0.93 ratio       PTT - ( 17 Aug 2023 12:44 )  PTT:36.2 sec  Urinalysis Basic - ( 17 Aug 2023 12:44 )    Color: x / Appearance: x / SG: x / pH: x  Gluc: 113 mg/dL / Ketone: x  / Bili: x / Urobili: x   Blood: x / Protein: x / Nitrite: x   Leuk Esterase: x / RBC: x / WBC x   Sq Epi: x / Non Sq Epi: x / Bacteria: x    < from: CT Angio Neck Stroke Protocol w/ IV Cont (08.17.23 @ 13:13) >  IMPRESSION:    CT PERFUSION:  No perfusion deficits to suggest areas of completed infarction or at risk   territory.    CTA HEAD/NECK:  No large vessel occlusion, aneurysm, or vascular malformation.    Moderate focal stenosis in the mid right M1 MCA. Additional irregular   stenosis in the M3 branches of the inferior division of the right MCA,   and M2/M3 branches of the superior division of the left MCA.    Irregular stenosis in the distal branches of the bilateral PCAs (P3/P4).    Asymmetric nonopacification of the distal left transverse sinus likely   due to slow flow. Confirmation can be considered with CTV or MRV.    Mild atherosclerosis stenosis in bilateral proximal cervical ICA.    Communication: The summary of above findings were discussed with readback   confirmation with Dr. Walter by radiologist Dr. Petersen on 8/17/2023 at   1:30 PM.    --- End of Report ---    CHELSEA PETERSEN MD; Attending Radiologist  This document has been electronically signed. Aug 17 2023  1:34PM    < end of copied text >    < from: CT Brain Stroke Protocol (08.17.23 @ 12:54) >    IMPRESSION:    No acute territorial infarct, intracranial hemorrhage, or midline shift.    Stable moderate chronic microvascular ischemic changes.    Communication: The summary of above findings were discussed with readback   confirmation with Dr. Walter by radiologist Dr. Petersen on 8/17/2023 at   1:30 PM.    --- End of Report ---      CHELSEA PETERSEN MD; Attending Radiologist  This document has been electronically signed. Aug 17 2023  1:35PM    < end of copied text >   Neurology Consult    Patient is a 73y old  Male who presents with a chief complaint of r/o cva (18 Aug 2023 07:28)    HPI:  73  years old male with hx hypothyroid paroxysmal afib recently switched off eliquis currently p/w acute onset L sided weakness around 12PM yesterday persistent symptoms.  On arrival NIHSS 2 per ED and pt evaluated by telestroke with no contraindications for IV thrombolysis s/p IV TNK.  CTA w/ R distal M2 stenosis but no LVO.  Pt admitted for post-thrombolysis care.  Of note, pt had a new diagnosis of afib 1 year ago, he was sent home on eliquis which he took for 1 month but was switched to ASA monotherapy for unclear reasons. Since thrombolysis symptoms have essentially resolved currently nearly baseline with slight  weakness but no drift otherwise.      In the ED vitals stable.   Labs unremarkable.   EKG NSR  CTH: No acute territorial infarct, intracranial hemorrhage, or midline shift. Stable moderate chronic microvascular ischemic changes.  CTP: No perfusion deficits to suggest areas of completed infarction or at risk territory.  CTA: Moderate focal stenosis in the mid right M1 MCA. Additional irregular stenosis in the M3 branches of the inferior division of the right MCA, and M2/M3 branches of the superior division of the left MCA. Irregular stenosis in the distal branches of the bilateral PCAs (P3/P4). Asymmetric nonopacification of the distal left transverse sinus likely due to slow flow. Confirmation can be considered with CTV or MRV. Mild atherosclerosis stenosis in bilateral proximal cervical ICA. No large vessel occlusion, aneurysm, or vascular malformation.   TNK administered in ED.  Patient has had interval resolution of symptoms. NO longer has weakness in L side and not feeling "off". Telestroke following. Plan to Maintain BP <180/105 mmHg; -180, SCD for DVT ppx, No ATP/AC until 24 hr f/u CT head without ICH, STAT CT head for any neuro decline.     PAST MEDICAL & SURGICAL HISTORY:  Hypothyroid    History of total knee replacement (TKR)  left    FAMILY HISTORY:  No pertinent family history in first degree relatives    Social History: (-) x 3    Allergies    No Known Allergies    Intolerances    MEDICATIONS  (STANDING):  atorvastatin 80 milliGRAM(s) Oral at bedtime  chlorhexidine 2% Cloths 1 Application(s) Topical <User Schedule>  levothyroxine 250 MICROGram(s) Oral daily  sodium chloride 0.9% Bolus 1000 milliLiter(s) IV Bolus once  sodium chloride 0.9% lock flush 10 milliLiter(s) IV Push once    MEDICATIONS  (PRN):    Review of systems:    Constitutional: as per HPI  Eyes: No eye pain or discharge  ENMT:  No difficulty hearing; No sinus or throat pain  Neck: No pain or stiffness  Respiratory: No cough, wheezing, chills or hemoptysis  Cardiovascular: No chest pain, palpitations, shortness of breath, dyspnea on exertion  Gastrointestinal: No abdominal pain, nausea, vomiting or hematemesis; No diarrhea or constipation.   Genitourinary: No dysuria, frequency, hematuria or incontinence  Neurological: As per HPI  Skin: No rashes or lesions   Endocrine: No heat or cold intolerance; No hair loss  Musculoskeletal: No joint pain or swelling  Psychiatric: No depression, anxiety, mood swings  Heme/Lymph: No easy bruising or bleeding gums    Vital Signs Last 24 Hrs  T(C): 35.8 (18 Aug 2023 07:01), Max: 37.2 (17 Aug 2023 12:43)  T(F): 96.5 (18 Aug 2023 07:01), Max: 98.9 (17 Aug 2023 12:43)  HR: 76 (18 Aug 2023 08:05) (66 - 93)  BP: 102/66 (18 Aug 2023 08:05) (102/66 - 173/101)  BP(mean): 78 (18 Aug 2023 08:05) (78 - 130)  RR: 18 (18 Aug 2023 08:05) (14 - 37)  SpO2: 91% (18 Aug 2023 08:05) (89% - 98%)    Parameters below as of 18 Aug 2023 07:22  Patient On (Oxygen Delivery Method): room air    Examination:  General:  Appearance is consistent with chronologic age.  No abnormal facies.  Gross skin survey within normal limits.    Cognitive/Language:  The patient is oriented to person, place, time and date.  Recent and remote memory intact.  Fund of knowledge is intact and normal.  Language with normal repetition, comprehension and naming.  Nondysarthric.    Eyes: intact VA, VFF.  EOMI w/o nystagmus, skew or reported double vision.  PERRL.  No ptosis/weakness of eyelid closure.    Face:  Facial sensation normal V1 - 3, no facial asymmetry.    Ears/Nose/Throat:  Hearing grossly intact b/l.  Palate elevates midline.  Tongue and uvula midline.   Motor examination:   Normal tone, bulk and range of motion.  No tenderness, twitching, tremors or involuntary movements.  Formal Muscle Strength Testing: (MRC grade R/L) 5/5 UE; 5/5 LE.  No observable drift.  Slight L  weakness.    Reflexes:   2+ b/l pectoralis, biceps, triceps, brachioradialis, patella and Achilles.  Plantar response downgoing b/l.  Jaw jerk, Koko, clonus absent.  Sensory examination:   Intact to light touch and pinprick, pain, temperature and proprioception and vibration in all extremities.  Cerebellum:   FTN/HKS intact with normal NAFISA in all limbs.  No dysmetria or dysdiadokinesia.     NIHSS 0    Labs:   CBC Full  -  ( 17 Aug 2023 12:44 )  WBC Count : 7.43 K/uL  RBC Count : 5.33 M/uL  Hemoglobin : 16.2 g/dL  Hematocrit : 49.7 %  Platelet Count - Automated : 291 K/uL  Mean Cell Volume : 93.2 fL  Mean Cell Hemoglobin : 30.4 pg  Mean Cell Hemoglobin Concentration : 32.6 g/dL  Auto Neutrophil # : 4.23 K/uL  Auto Lymphocyte # : 2.10 K/uL  Auto Monocyte # : 0.75 K/uL  Auto Eosinophil # : 0.27 K/uL  Auto Basophil # : 0.07 K/uL  Auto Neutrophil % : 57.0 %  Auto Lymphocyte % : 28.3 %  Auto Monocyte % : 10.1 %  Auto Eosinophil % : 3.6 %  Auto Basophil % : 0.9 %    08-17    144  |  105  |  15  ----------------------------<  113<H>  4.1   |  27  |  1.4    Ca    9.3      17 Aug 2023 12:44    TPro  6.8  /  Alb  4.3  /  TBili  0.6  /  DBili  x   /  AST  17  /  ALT  17  /  AlkPhos  97  08-17    LIVER FUNCTIONS - ( 17 Aug 2023 12:44 )  Alb: 4.3 g/dL / Pro: 6.8 g/dL / ALK PHOS: 97 U/L / ALT: 17 U/L / AST: 17 U/L / GGT: x           PT/INR - ( 17 Aug 2023 12:44 )   PT: 10.60 sec;   INR: 0.93 ratio       PTT - ( 17 Aug 2023 12:44 )  PTT:36.2 sec  Urinalysis Basic - ( 17 Aug 2023 12:44 )    Color: x / Appearance: x / SG: x / pH: x  Gluc: 113 mg/dL / Ketone: x  / Bili: x / Urobili: x   Blood: x / Protein: x / Nitrite: x   Leuk Esterase: x / RBC: x / WBC x   Sq Epi: x / Non Sq Epi: x / Bacteria: x    < from: CT Angio Neck Stroke Protocol w/ IV Cont (08.17.23 @ 13:13) >  IMPRESSION:    CT PERFUSION:  No perfusion deficits to suggest areas of completed infarction or at risk   territory.    CTA HEAD/NECK:  No large vessel occlusion, aneurysm, or vascular malformation.    Moderate focal stenosis in the mid right M1 MCA. Additional irregular   stenosis in the M3 branches of the inferior division of the right MCA,   and M2/M3 branches of the superior division of the left MCA.    Irregular stenosis in the distal branches of the bilateral PCAs (P3/P4).    Asymmetric nonopacification of the distal left transverse sinus likely   due to slow flow. Confirmation can be considered with CTV or MRV.    Mild atherosclerosis stenosis in bilateral proximal cervical ICA.    Communication: The summary of above findings were discussed with readback   confirmation with Dr. Walter by radiologist Dr. Petersen on 8/17/2023 at   1:30 PM.    --- End of Report ---    CHELSEA PETERSEN MD; Attending Radiologist  This document has been electronically signed. Aug 17 2023  1:34PM    < end of copied text >    < from: CT Brain Stroke Protocol (08.17.23 @ 12:54) >    IMPRESSION:    No acute territorial infarct, intracranial hemorrhage, or midline shift.    Stable moderate chronic microvascular ischemic changes.    Communication: The summary of above findings were discussed with readback   confirmation with Dr. Walter by radiologist Dr. Petersen on 8/17/2023 at   1:30 PM.    --- End of Report ---      CHELSEA PETERSEN MD; Attending Radiologist  This document has been electronically signed. Aug 17 2023  1:35PM    < end of copied text >

## 2023-08-18 NOTE — PROGRESS NOTE ADULT - ASSESSMENT
73  years old male with hx hypothyroid paroxysmal afib off eliquis present for left sided weakness s/p TNK with improvement of symptoms     #Acute Stroke -  s/p TNK  - 2/2 embolic event likely , likely 2/2 P Afib  - EKG-NSR, Tele reveals no significant arrythmias, couple of PVC`s and couple of artifacts  - CTH - noted  - repeat HCT 24 hr s/p TNK as per post-thrombolysis protocol  - if rpt HCT (-), start ASA 81 mg QD  - Lipitor 80 mg QD  - MRI Brain NC  - MRV Head w/ bryon   - c/w telemetry monitoring   - keep  - 180 for now  - neurochecks as per post-thrombolysis protocol  - PT/OT/SLP    #paroxsymal Afib   - EKG shows NSR   - is off eliquis per his choice, he says now will take it  - plan to resume eliquis on DC     #hypothyroid- c/w levothyroxine 250mcg. Repeat TSH    #Misc  - DVT Prophylaxis: hold AC, SCD   - Diet:DASH  - Activity:bedrest   - Code Status: FULL CODE
- - -

## 2023-08-18 NOTE — OCCUPATIONAL THERAPY INITIAL EVALUATION ADULT - ORIENTATION, REHAB EVAL
Anastacio Morales Patient Age: 4 year old  MESSAGE:   Mother calling to make 4 year well visit for patient.  Mother states she also would like both of them to get flu shot   Mother states she would like to talk to Dr John about some behavorial issues.  Mother was positive on covid screening with runny nose.  Mother states she saw doctor today and it is allergy related.  Please advise mother if she can schedule patient .  Routed to provider's clinical pool.   Message confirmed with caller.      WEIGHT AND HEIGHT:   Wt Readings from Last 1 Encounters:   12/19/19 20.8 kg (45 lb 12.8 oz) (94 %, Z= 1.56)*     * Growth percentiles are based on CDC (Boys, 2-20 Years) data.     Ht Readings from Last 1 Encounters:   10/28/19 3' 6.8\" (1.087 m) (90 %, Z= 1.29)*     * Growth percentiles are based on CDC (Boys, 2-20 Years) data.     BMI Readings from Last 1 Encounters:   10/28/19 17.46 kg/m² (92 %, Z= 1.42)*     * Growth percentiles are based on CDC (Boys, 2-20 Years) data.       ALLERGIES:  Patient has no known allergies.  No current outpatient medications on file.     No current facility-administered medications for this visit.      PHARMACY to use: see below          Pharmacy preference(s) on file:   [id number] - [place], [place] - [address]  [address] [address]  Phone: [phone] Fax: [phone]      CALL BACK INFO: Ok to leave response (including medical information) on answering machine  ROUTING: Patient's physician/staff        PCP: Neela John MD         INS: Payor: [employer] / Plan: CHOICE PLUS/2400 / Product Type: PPO MISC   PATIENT ADDRESS:  [address] [place] [id number]  
Appointment scheduled for 10/2/20 with Dr John. Mom concerned with patient hitting, kicking, and swearing. Advised mom can discuss with provider, but may be referred out to /counselor.   
oriented to person, place, time and situation

## 2023-08-18 NOTE — CONSULT NOTE ADULT - ASSESSMENT
73  years old male with hx hypothyroid paroxysmal afib off anticoagulation p/w acute LHP s/o CVA s/p thrombolysis with TNK    Recommendations:  - repeat HCT 24 hr s/p TNK as per post-thrombolysis protocol  - if rpt HCT (-), may start ASA 81 mg QD  - Lipitor 80 mg QD  - MRI Brain NC  - MRV Head w/ bryon   - Echo  - cardiac telemetry  - keep  - 180 for now  - neurochecks as per post-thrombolysis protocol  - PT/OT/SLP 73  years old male with hx hypothyroid paroxysmal afib off anticoagulation p/w acute LHP s/o CVA s/p thrombolysis with TNK currently doing well.  Possible cardioembolic w/ h/o PAF off anticoagulation.    Recommendations:  - repeat HCT 24 hr s/p TNK as per post-thrombolysis protocol  - if rpt HCT (-), may start ASA 81 mg QD  - Lipitor 80 mg QD  - MRI Brain NC  - MRV Head w/ bryon   - Echo  - cardiac telemetry  - keep  - 180 for now  - neurochecks as per post-thrombolysis protocol  - PT/OT/SLP

## 2023-08-18 NOTE — CONSULT NOTE ADULT - ASSESSMENT
IMPRESSION: Rehab of 73  y.o  m  rehab  for  cva  sp  tpa lt  side  weakness      PRECAUTIONS: [  ] Cardiac  [  ] Respiratory  [  ] Seizures [  ] Contact Isolation  [  ] Droplet Isolation  [ FALL ] Other    Weight Bearing Status:     RECOMMENDATION:    Out of Bed to Chair     DVT/Decubiti Prophylaxis    REHAB PLAN:     [  x ] Bedside P/T 3-5 times a week   [   ]   Bedside O/T  2-3 times a week             [   ] No Rehab Therapy Indicated                   [   ]  Speech Therapy   Conditioning/ROM                                    ADL  Bed Mobility                                               Conditioning/ROM  Transfers                                                     Bed Mobility  Sitting /Standing Balance                         Transfers                                        Gait Training                                               Sitting/Standing Balance  Stair Training [   ]Applicable                    Home equipment Eval                                                                        Splinting  [   ] Only      GOALS:   ADL   [  x ]   Independent                    Transfers  [   ] Independent                          Ambulation  [ x  ] Independent     [    ] With device                            [   ]  CG                                                         [   ]  CG                                                                  [   ] CG                            [    ] Min A                                                   [   ] Min A                                                              [   ] Min  A          DISCHARGE PLAN:   [   ]  Good candidate for Intensive Rehabilitation/Hospital based-4A [facility]                                             Will tolerate 3hrs Intensive Rehab Daily                                       [    ]  Short Term Rehab in Skilled Nursing Facility                                       [ xx   ]  Home with Outpatient or VN services fu  with  neurology  and  pmd cont [facility] d/w  ptn rehab  program  and  hep                                         [    ]  Possible Candidate for Intensive Hospital based Rehab

## 2023-08-18 NOTE — SWALLOW BEDSIDE ASSESSMENT ADULT - COMMENTS
CTH: No acute territorial infarct, intracranial hemorrhage, or midline shift. Stable moderate chronic microvascular ischemic changes.  CTP: No perfusion deficits to suggest areas of completed infarction or at risk territory.  CTA: Moderate focal stenosis in the mid right M1 MCA. Additional irregular stenosis in the M3 branches of the inferior division of the right MCA, and M2/M3 branches of the superior division of the left MCA. Irregular stenosis in the distal branches of the bilateral PCAs (P3/P4). Asymmetric nonopacification of the distal left transverse sinus likely due to slow flow. Confirmation can be considered with CTV or MRV. Mild atherosclerosis stenosis in bilateral proximal cervical ICA. No large vessel occlusion, aneurysm, or vascular malformation.

## 2023-08-18 NOTE — CONSULT NOTE ADULT - SUBJECTIVE AND OBJECTIVE BOX
Patient is a 73y old  Male who presents with a chief complaint of r/o cva (18 Aug 2023 09:46)      HPI:  73  years old male with hx hypothyroid paroxysmal afib off eliquis present for left sided weakness. LKW 1-2 hours prior to arrival. NIHSS 2 as per ED physician. Was having breakfast when he felt "off". He began to notice his L hand cramping, and then his L arm dropped. He tried to get up his L leg was weak and felt like he was going to fall. Denies palpitations, headache, visual changes, SOB, chest pain, N/V, diarrhea.  Pt had a new diagnosis of afib 1 year ago, he was sent home on eliquis which he took for 1 month but was switched to ASA monotherapy after discussing with his doctor.       In the ED vitals stable.   Labs unremarkable.   EKG NSR  CTH: No acute territorial infarct, intracranial hemorrhage, or midline shift. Stable moderate chronic microvascular ischemic changes.  CTP: No perfusion deficits to suggest areas of completed infarction or at risk territory.  CTA: Moderate focal stenosis in the mid right M1 MCA. Additional irregular stenosis in the M3 branches of the inferior division of the right MCA, and M2/M3 branches of the superior division of the left MCA. Irregular stenosis in the distal branches of the bilateral PCAs (P3/P4). Asymmetric nonopacification of the distal left transverse sinus likely due to slow flow. Confirmation can be considered with CTV or MRV. Mild atherosclerosis stenosis in bilateral proximal cervical ICA. No large vessel occlusion, aneurysm, or vascular malformation.   TNK administered in ED.  Patient has had interval resolution of symptoms. NO longer has weakness in L side and not feeling "off". Telestroke following. Plan to Maintain BP <180/105 mmHg; -180, SCD for DVT ppx, No ATP/AC until 24 hr f/u CT head without ICH, STAT CT head for any neuro decline.    (17 Aug 2023 14:30)      PAST MEDICAL & SURGICAL HISTORY:  Hypothyroid      History of total knee replacement (TKR)  left      SOCIAL HX:   Smoking                         ETOH                            Other    FAMILY HISTORY:  No pertinent family history in first degree relatives    :  No known cardiovacular family hisotry     Review Of Systems:     All ROS are negative except per HPI       Allergies    No Known Allergies    Intolerances      PHYSICAL EXAM    ICU Vital Signs Last 24 Hrs  T(C): 35.8 (18 Aug 2023 07:01), Max: 37.2 (17 Aug 2023 12:43)  T(F): 96.5 (18 Aug 2023 07:01), Max: 98.9 (17 Aug 2023 12:43)  HR: 76 (18 Aug 2023 09:00) (66 - 93)  BP: 130/74 (18 Aug 2023 09:00) (102/66 - 173/101)  BP(mean): 94 (18 Aug 2023 09:00) (78 - 130)  ABP: --  ABP(mean): --  RR: 18 (18 Aug 2023 09:00) (14 - 37)  SpO2: 94% (18 Aug 2023 09:00) (89% - 98%)    O2 Parameters below as of 18 Aug 2023 09:00  Patient On (Oxygen Delivery Method): room air            CONSTITUTIONAL:  Well nourished.   NAD      CARDIAC:   Normal rate,   Regular rhythm.    No edema      RESPIRATORY:   No wheezing  Bilateral BS   Not tachypneic,  No use of accessory muscles    GASTROINTESTINAL:  Abdomen soft,   Non-tender,   No guarding,         NEUROLOGICAL:   Alert and oriented   No motor deficits.        08-17-23 @ 07:01  -  08-18-23 @ 07:00  --------------------------------------------------------  IN:    Sodium Chloride 0.9% Bolus: 500 mL  Total IN: 500 mL    OUT:    Voided (mL): 700 mL  Total OUT: 700 mL    Total NET: -200 mL      08-18-23 @ 07:01  -  08-18-23 @ 10:36  --------------------------------------------------------  IN:    Sodium Chloride 0.9% Bolus: 1000 mL  Total IN: 1000 mL    OUT:    Voided (mL): 300 mL  Total OUT: 300 mL    Total NET: 700 mL          LABS:                          16.2   7.43  )-----------( 291      ( 17 Aug 2023 12:44 )             49.7                                               08-17    144  |  105  |  15  ----------------------------<  113<H>  4.1   |  27  |  1.4    Ca    9.3      17 Aug 2023 12:44    TPro  6.8  /  Alb  4.3  /  TBili  0.6  /  DBili  x   /  AST  17  /  ALT  17  /  AlkPhos  97  08-17      PT/INR - ( 17 Aug 2023 12:44 )   PT: 10.60 sec;   INR: 0.93 ratio         PTT - ( 17 Aug 2023 12:44 )  PTT:36.2 sec                                       Urinalysis Basic - ( 17 Aug 2023 12:44 )    Color: x / Appearance: x / SG: x / pH: x  Gluc: 113 mg/dL / Ketone: x  / Bili: x / Urobili: x   Blood: x / Protein: x / Nitrite: x   Leuk Esterase: x / RBC: x / WBC x   Sq Epi: x / Non Sq Epi: x / Bacteria: x        CARDIAC MARKERS ( 17 Aug 2023 12:44 )  x     / <0.01 ng/mL / x     / x     / x                                                LIVER FUNCTIONS - ( 17 Aug 2023 12:44 )  Alb: 4.3 g/dL / Pro: 6.8 g/dL / ALK PHOS: 97 U/L / ALT: 17 U/L / AST: 17 U/L / GGT: x                                                                                                                                       X-Rays reviewed                                                                                     ECHO    CXR interpreted by me     MEDICATIONS  (STANDING):  atorvastatin 80 milliGRAM(s) Oral at bedtime  chlorhexidine 2% Cloths 1 Application(s) Topical <User Schedule>  levothyroxine 250 MICROGram(s) Oral daily  sodium chloride 0.9% lock flush 10 milliLiter(s) IV Push once    MEDICATIONS  (PRN):

## 2023-08-18 NOTE — OCCUPATIONAL THERAPY INITIAL EVALUATION ADULT - PERTINENT HX OF CURRENT PROBLEM, REHAB EVAL
73  years old M with hx hypothyroid paroxysmal afib off eliquis present for left sided weakness. LKW 1-2 hours prior to arrival. NIHSS 2 as per ED physician. Was having breakfast when he felt "off". He began to notice his L hand cramping, and then his L arm dropped. He tried to get up his L leg was weak and felt like he was going to fall. Pt had a new diagnosis of afib 1 year ago, he was sent home on eliquis which he took for 1 month but was switched to ASA monotherapy after discussing with his doctor.   s/p TNK    CT brain 8/17: No acute territorial infarct, intracranial hemorrhage, or midline shift. Stable moderate chronic microvascular ischemic changes. CTA HEAD/NECK: No large vessel occlusion, aneurysm, or vascular malformation. Moderate focal stenosis in the mid right M1 MCA. Additional irregular stenosis in the M3 branches of the inferior division of the right MCA, and M2/M3 branches of the superior division of the left MCA. Irregular stenosis in the distal branches of the bilateral PCAs (P3/P4). Asymmetric nonopacification of the distal left transverse sinus likely due to slow flow. Confirmation can be considered with CTV or MRV. Mild atherosclerosis stenosis in bilateral proximal cervical ICA.    MRI pending

## 2023-08-18 NOTE — PHYSICAL THERAPY INITIAL EVALUATION ADULT - ADDITIONAL COMMENTS
Pt reports he lives with his wife in house with 4 MUSA and flight of steps to bedroom, which he has chair lift for his mom. Pt amb independently without AD PTA.

## 2023-08-18 NOTE — CONSULT NOTE ADULT - ATTENDING COMMENTS
Attending Statement: I have personally performed a face to face diagnostic evaluation on this patient. The patient is suffering from:  acute rt MCA stroke s/p TNKase  parox afib not on eliquis  hypothyroidism   I have made amendments to the documentation where necessary. I have personally seen and examined this patient.  I have fully participated in the care of this patient.  I have reviewed all pertinent clinical information, including history, physical exam, plan and note.

## 2023-08-18 NOTE — OCCUPATIONAL THERAPY INITIAL EVALUATION ADULT - ADDITIONAL COMMENTS
PLOF obtained from pt. Pt reported that he owns suction cup and shower chair, was not using PTA.   (+) driving

## 2023-08-18 NOTE — PHYSICAL THERAPY INITIAL EVALUATION ADULT - PERTINENT HX OF CURRENT PROBLEM, REHAB EVAL
Reason for Admission: r/o cva  History of Present Illness:   73  years old male with hx hypothyroid paroxysmal afib off eliquis present for left sided weakness. LKW 1-2 hours prior to arrival. NIHSS 2 as per ED physician. Was having breakfast when he felt "off". He began to notice his L hand cramping, and then his L arm dropped. He tried to get up his L leg was weak and felt like he was going to fall. Denies palpitations, headache, visual changes, SOB, chest pain, N/V, diarrhea.  Pt had a new diagnosis of afib 1 year ago, he was sent home on eliquis which he took for 1 month but was switched to ASA monotherapy after discussing with his doctor.

## 2023-08-18 NOTE — SWALLOW BEDSIDE ASSESSMENT ADULT - SPECIFY REASON(S)
Detail Level: Zone Quality 265: Biopsy Follow-Up: Biopsy results reviewed, communicated, tracked, and documented Dysphagia evaluation

## 2023-08-18 NOTE — OCCUPATIONAL THERAPY INITIAL EVALUATION ADULT - GENERAL OBSERVATIONS, REHAB EVAL
9:50-10:15; chart reviewed, ok to treat by Occupational Therapist as confirmed by FERNANDO Clements, Pt received seated in ASU recliner +tele +BP cuff +RUE heplock +pulse ox in NAD. Pt denied pain at rest and in agreement with OT IE.

## 2023-08-18 NOTE — CONSULT NOTE ADULT - SUBJECTIVE AND OBJECTIVE BOX
HPI: 73  years old male with hx of  hypothyroid paroxysmal afib off eliquis present for left sided weakness. LKW 1-2 hours prior to arrival. NIHSS 2 as per ED physician. Was having breakfast when he felt "off". He began to notice his L hand cramping, and then his L arm dropped. He tried to get up his L leg was weak and felt like he was going to fall. Denies palpitations, headache, visual changes, SOB, chest pain, N/V, diarrhea.  Pt had a new diagnosis of afib 1 year ago, he was sent home on eliquis which he took for 1 month but was switched to ASA monotherapy after discussing with his doctor.       In the ED vitals stable.   Labs unremarkable.   EKG NSR  CTH: No acute territorial infarct, intracranial hemorrhage, or midline shift. Stable moderate chronic microvascular ischemic changes.  CTP: No perfusion deficits to suggest areas of completed infarction or at risk territory.  CTA: Moderate focal stenosis in the mid right M1 MCA. Additional irregular stenosis in the M3 branches of the inferior division of the right MCA, and M2/M3 branches of the superior division of the left MCA. Irregular stenosis in the distal branches of the bilateral PCAs (P3/P4). Asymmetric nonopacification of the distal left transverse sinus likely due to slow flow. Confirmation can be considered with CTV or MRV. Mild atherosclerosis stenosis in bilateral proximal cervical ICA. No large vessel occlusion, aneurysm, or vascular malformation.   TNK administered in ED.  Patient has had interval resolution of symptoms. NO longer has weakness in L side and not feeling "off". Telestroke following. Plan to Maintain BP <180/105 mmHg; -180, SCD for DVT ppx, No ATP/AC until 24 hr f/u CT head without ICH, STAT CT head for any neuro decline.   ptn  referred  to acute  rehab  for  eval and  tx  ptn seen and  exam at  bed  side  pleasant  to  talk  to  him  [id number]  no new  co  oob  to  chair  , ptn lab  and  imaging  as  well as  medical  notes  are  appreciated  and  reviewed  ,    PTN  REFERRED TO ACUTE  REHAB  FOR  EVAL AND  TX   PAST MEDICAL & SURGICAL HISTORY:  Hypothyroid      History of total knee replacement (TKR)  left          Hospital Course:    TODAY'S SUBJECTIVE & REVIEW OF SYMPTOMS:     Constitutional WNL   Cardio WNL   Resp WNL   GI WNL  Heme WNL  Endo WNL  Skin WNL  MSK WNL  Neuro WNL  Cognitive WNL  Psych WNL      MEDICATIONS  (STANDING):  atorvastatin 80 milliGRAM(s) Oral at bedtime  chlorhexidine 2% Cloths 1 Application(s) Topical <User Schedule>  levothyroxine 250 MICROGram(s) Oral daily  sodium chloride 0.9% lock flush 10 milliLiter(s) IV Push once    MEDICATIONS  (PRN):      FAMILY HISTORY:  No pertinent family history in first degree relatives        Allergies    No Known Allergies    Intolerances        SOCIAL HISTORY:    [  ] Etoh  [  ] Smoking  [  ] Substance abuse     Home Environment:  [  ] Home Alone  [ x ] Lives with Family  [  ] Home Health Aid    Dwelling:  [  ] Apartment  [ x ] Private House  [  ] Adult Home  [  ] Skilled Nursing Facility      [  ] Short Term  [  ] Long Term  [ x ] Stairs       Elevator [  ]    FUNCTIONAL STATUS PTA: (Check all that apply)  Ambulation: [  x ]Independent    [  ] Dependent     [  ] Non-Ambulatory  Assistive Device: [  ] SA Cane  [  ]  Q Cane  [  ] Walker  [  ]  Wheelchair  ADL : [  x] Independent  [  ]  Dependent       Vital Signs Last 24 Hrs  T(C): 35.8 (18 Aug 2023 07:01), Max: 37.2 (17 Aug 2023 12:43)  T(F): 96.5 (18 Aug 2023 07:01), Max: 98.9 (17 Aug 2023 12:43)  HR: 76 (18 Aug 2023 08:05) (66 - 93)  BP: 102/66 (18 Aug 2023 08:05) (102/66 - 173/101)  BP(mean): 78 (18 Aug 2023 08:05) (78 - 130)  RR: 18 (18 Aug 2023 08:05) (14 - 37)  SpO2: 91% (18 Aug 2023 08:05) (89% - 98%)    Parameters below as of 18 Aug 2023 07:22  Patient On (Oxygen Delivery Method): room air          PHYSICAL EXAM: Alert & Oriented X3  GENERAL: NAD, well-groomed, well-developed  HEAD:  Atraumatic, Normocephalic  EYES: EOMI, PERRLA, conjunctiva and sclera clear  NECK: Supple, No JVD, Normal thyroid  CHEST/LUNG: Clear to percussion bilaterally; No rales, rhonchi, wheezing, or rubs  HEART: Regular rate and rhythm; No murmurs, rubs, or gallops  ABDOMEN: Soft, Nontender, Nondistended; Bowel sounds present  EXTREMITIES:  2+ Peripheral Pulses, No clubbing, cyanosis, or edema    NERVOUS SYSTEM:  Cranial Nerves 2-12 intact [ x ] Abnormal  [  ]  ROM: WFL all extremities [x  ]  Abnormal [  ]  Motor Strength: WFL all extremities  [ x ]  Abnormal [  ]  Sensation: intact to light touch [ x ] Abnormal [  ]  Reflexes: Symmetric [ x ]  Abnormal [  ]    FUNCTIONAL STATUS:  Bed Mobility: Independent [  ]  Supervision [ x ]  Needs Assistance [  ]  N/A [  ]  Transfers: Independent [  ]  Supervision [ x ]  Needs Assistance [  ]  N/A [  ]   Ambulation: Independent [  ]  Supervision [ x ]  Needs Assistance [  ]  N/A [  ]  ADL: Independent [ x ] Requires Assistance [  ] N/A [  ]  SEE PT/OT IE NOTES    LABS:                        16.2   7.43  )-----------( 291      ( 17 Aug 2023 12:44 )             49.7     08-17    144  |  105  |  15  ----------------------------<  113<H>  4.1   |  27  |  1.4    Ca    9.3      17 Aug 2023 12:44    TPro  6.8  /  Alb  4.3  /  TBili  0.6  /  DBili  x   /  AST  17  /  ALT  17  /  AlkPhos  97  08-17    PT/INR - ( 17 Aug 2023 12:44 )   PT: 10.60 sec;   INR: 0.93 ratio         PTT - ( 17 Aug 2023 12:44 )  PTT:36.2 sec  Urinalysis Basic - ( 17 Aug 2023 12:44 )    Color: x / Appearance: x / SG: x / pH: x  Gluc: 113 mg/dL / Ketone: x  / Bili: x / Urobili: x   Blood: x / Protein: x / Nitrite: x   Leuk Esterase: x / RBC: x / WBC x   Sq Epi: x / Non Sq Epi: x / Bacteria: x        RADIOLOGY & ADDITIONAL STUDIES:< from: CT Brain Perfusion Maps Stroke (08.17.23 @ 13:06) >    CT PERFUSION:  No perfusion deficits to suggest areas of completed infarction or at risk   territory.    CTA HEAD/NECK:  No large vessel occlusion, aneurysm, or vascular malformation.    Moderate focal stenosis in the mid right M1 MCA. Additional irregular   stenosis in the M3 branches of the inferior division of the right MCA,   and M2/M3 branches of the superior division of the left MCA.    Irregular stenosis in the distal branches of the bilateral PCAs (P3/P4).    Asymmetric nonopacification of the distal left transverse sinus likely   due to slow flow. Confirmation can be considered with CTV or MRV.    Mild atherosclerosis stenosis in bilateral proximal cervical ICA.      < end of copied text >      Assesment:

## 2023-08-18 NOTE — SWALLOW BEDSIDE ASSESSMENT ADULT - SLP GENERAL OBSERVATIONS
Pt. received OOB in chair oriented to person, place, time, and event. Pt. reports left sided weakness as resolved.

## 2023-08-18 NOTE — PROVIDER CONTACT NOTE (OTHER) - ASSESSMENT
Pt in no pain or distress. Given 500cc fluid bolus for low BP out of SBP parameters approximately 0630.

## 2023-08-18 NOTE — PHYSICAL THERAPY INITIAL EVALUATION ADULT - GENERAL OBSERVATIONS, REHAB EVAL
11:34-11:50 Chart reviewed. Patient available to be seen for physical therapy, denies pain, confirmed with RN.  Pt rec'd in recliner + tele box + all CCU lines intact, pt in NAD.

## 2023-08-18 NOTE — CONSULT NOTE ADULT - ASSESSMENT
Impression:  acute rt MCA stroke s/p TNKase  parox afib not on eliquis  hypothyroidism     Plan:  - post thrombolytics protocol  - CT head today at 1PM (24HRS AFTER TNKase), if (-) can downgrade and start asa   - needs MRI before restarting eliquis per neuro  - neuro recs appreciated  - aspiration/fall precautions  - PT/OT  - echo noted    ICU, dgt tele after cth scan (-)  Impression:  acute rt MCA stroke s/p TNKase  parox afib not on eliquis  hypothyroidism     Plan:  - post thrombolytics protocol  - CT head today at 1PM (24HRS AFTER TNKase), if (-) can downgrade SDU and start asa   - needs MRI before restarting eliquis per neuro  - neuro recs appreciated  - aspiration/fall precautions  - PT/OT  - echo noted    ICU, dgt tele after cth scan (-)

## 2023-08-19 LAB
ALBUMIN SERPL ELPH-MCNC: 4.1 G/DL — SIGNIFICANT CHANGE UP (ref 3.5–5.2)
ALP SERPL-CCNC: 83 U/L — SIGNIFICANT CHANGE UP (ref 30–115)
ALT FLD-CCNC: 15 U/L — SIGNIFICANT CHANGE UP (ref 0–41)
ANION GAP SERPL CALC-SCNC: 10 MMOL/L — SIGNIFICANT CHANGE UP (ref 7–14)
AST SERPL-CCNC: 18 U/L — SIGNIFICANT CHANGE UP (ref 0–41)
BASOPHILS # BLD AUTO: 0.07 K/UL — SIGNIFICANT CHANGE UP (ref 0–0.2)
BASOPHILS NFR BLD AUTO: 0.9 % — SIGNIFICANT CHANGE UP (ref 0–1)
BILIRUB SERPL-MCNC: 0.6 MG/DL — SIGNIFICANT CHANGE UP (ref 0.2–1.2)
BUN SERPL-MCNC: 16 MG/DL — SIGNIFICANT CHANGE UP (ref 10–20)
CALCIUM SERPL-MCNC: 9.1 MG/DL — SIGNIFICANT CHANGE UP (ref 8.4–10.5)
CHLORIDE SERPL-SCNC: 106 MMOL/L — SIGNIFICANT CHANGE UP (ref 98–110)
CO2 SERPL-SCNC: 26 MMOL/L — SIGNIFICANT CHANGE UP (ref 17–32)
CREAT SERPL-MCNC: 0.9 MG/DL — SIGNIFICANT CHANGE UP (ref 0.7–1.5)
EGFR: 90 ML/MIN/1.73M2 — SIGNIFICANT CHANGE UP
EOSINOPHIL # BLD AUTO: 0.26 K/UL — SIGNIFICANT CHANGE UP (ref 0–0.7)
EOSINOPHIL NFR BLD AUTO: 3.5 % — SIGNIFICANT CHANGE UP (ref 0–8)
GLUCOSE SERPL-MCNC: 110 MG/DL — HIGH (ref 70–99)
HCT VFR BLD CALC: 45.7 % — SIGNIFICANT CHANGE UP (ref 42–52)
HGB BLD-MCNC: 15.1 G/DL — SIGNIFICANT CHANGE UP (ref 14–18)
IMM GRANULOCYTES NFR BLD AUTO: 0.1 % — SIGNIFICANT CHANGE UP (ref 0.1–0.3)
LYMPHOCYTES # BLD AUTO: 1.96 K/UL — SIGNIFICANT CHANGE UP (ref 1.2–3.4)
LYMPHOCYTES # BLD AUTO: 26.6 % — SIGNIFICANT CHANGE UP (ref 20.5–51.1)
MAGNESIUM SERPL-MCNC: 2.1 MG/DL — SIGNIFICANT CHANGE UP (ref 1.8–2.4)
MCHC RBC-ENTMCNC: 30.4 PG — SIGNIFICANT CHANGE UP (ref 27–31)
MCHC RBC-ENTMCNC: 33 G/DL — SIGNIFICANT CHANGE UP (ref 32–37)
MCV RBC AUTO: 92 FL — SIGNIFICANT CHANGE UP (ref 80–94)
MONOCYTES # BLD AUTO: 0.88 K/UL — HIGH (ref 0.1–0.6)
MONOCYTES NFR BLD AUTO: 11.9 % — HIGH (ref 1.7–9.3)
NEUTROPHILS # BLD AUTO: 4.19 K/UL — SIGNIFICANT CHANGE UP (ref 1.4–6.5)
NEUTROPHILS NFR BLD AUTO: 57 % — SIGNIFICANT CHANGE UP (ref 42.2–75.2)
NRBC # BLD: 0 /100 WBCS — SIGNIFICANT CHANGE UP (ref 0–0)
PLATELET # BLD AUTO: 249 K/UL — SIGNIFICANT CHANGE UP (ref 130–400)
PMV BLD: 9.2 FL — SIGNIFICANT CHANGE UP (ref 7.4–10.4)
POTASSIUM SERPL-MCNC: 4.2 MMOL/L — SIGNIFICANT CHANGE UP (ref 3.5–5)
POTASSIUM SERPL-SCNC: 4.2 MMOL/L — SIGNIFICANT CHANGE UP (ref 3.5–5)
PROT SERPL-MCNC: 6.2 G/DL — SIGNIFICANT CHANGE UP (ref 6–8)
RBC # BLD: 4.97 M/UL — SIGNIFICANT CHANGE UP (ref 4.7–6.1)
RBC # FLD: 13.4 % — SIGNIFICANT CHANGE UP (ref 11.5–14.5)
SODIUM SERPL-SCNC: 142 MMOL/L — SIGNIFICANT CHANGE UP (ref 135–146)
WBC # BLD: 7.37 K/UL — SIGNIFICANT CHANGE UP (ref 4.8–10.8)
WBC # FLD AUTO: 7.37 K/UL — SIGNIFICANT CHANGE UP (ref 4.8–10.8)

## 2023-08-19 PROCEDURE — 70551 MRI BRAIN STEM W/O DYE: CPT | Mod: 26

## 2023-08-19 PROCEDURE — 70545 MR ANGIOGRAPHY HEAD W/DYE: CPT | Mod: 26,XU

## 2023-08-19 RX ADMIN — Medication 81 MILLIGRAM(S): at 13:10

## 2023-08-19 RX ADMIN — Medication 250 MICROGRAM(S): at 05:45

## 2023-08-19 RX ADMIN — CHLORHEXIDINE GLUCONATE 1 APPLICATION(S): 213 SOLUTION TOPICAL at 05:46

## 2023-08-19 RX ADMIN — ATORVASTATIN CALCIUM 80 MILLIGRAM(S): 80 TABLET, FILM COATED ORAL at 21:23

## 2023-08-19 NOTE — PROGRESS NOTE ADULT - SUBJECTIVE AND OBJECTIVE BOX
Name: TRAMAINE PINEDA  Age: 73y  Gender: Male    Pt was seen and examined.   c/o:  doing well he says, no complaints, no weakness, feels great he says    Allergies:  No Known Allergies      PHYSICAL EXAM:    Vitals:  T(C): 35.6 (08-19-23 @ 07:05), Max: 36.3 (08-18-23 @ 15:40)  HR: 70 (08-19-23 @ 04:00) (66 - 81)  BP: 126/76 (08-19-23 @ 04:00) (102/66 - 157/83)  RR: 16 (08-19-23 @ 07:05) (16 - 20)  SpO2: 94% (08-19-23 @ 04:00) (91% - 95%)  Wt(kg): --Vital Signs Last 24 Hrs  T(C): 35.6 (19 Aug 2023 07:05), Max: 36.3 (18 Aug 2023 15:40)  T(F): 96 (19 Aug 2023 07:05), Max: 97.4 (18 Aug 2023 15:40)  HR: 70 (19 Aug 2023 04:00) (66 - 81)  BP: 126/76 (19 Aug 2023 04:00) (102/66 - 157/83)  BP(mean): 96 (19 Aug 2023 04:00) (78 - 113)  RR: 16 (19 Aug 2023 07:05) (16 - 20)  SpO2: 94% (19 Aug 2023 04:00) (91% - 95%)    Parameters below as of 19 Aug 2023 04:00  Patient On (Oxygen Delivery Method): room air          NECK: Supple, No JVD  CHEST/LUNG: CTA, B/L, No rales, rhonchi, wheezing, or rubs  HEART: S1,S2, N1 Regular rate and rhythm; No murmurs, rubs, or gallops  ABDOMEN: Soft, Nontender, Nondistended; Bowel sounds present  EXTREMITIES:  2+ Peripheral Pulses, No clubbing, cyanosis, or edema  neuro exam - motor - 5/5 , sensory grossly intact      LABS:                        15.1   7.37  )-----------( 249      ( 19 Aug 2023 05:57 )             45.7     08-19    142  |  106  |  16  ----------------------------<  110<H>  4.2   |  26  |  0.9    Ca    9.1      19 Aug 2023 05:57  Mg     2.1     08-19    TPro  6.2  /  Alb  4.1  /  TBili  0.6  /  DBili  x   /  AST  18  /  ALT  15  /  AlkPhos  83  08-19    LIVER FUNCTIONS - ( 19 Aug 2023 05:57 )  Alb: 4.1 g/dL / Pro: 6.2 g/dL / ALK PHOS: 83 U/L / ALT: 15 U/L / AST: 18 U/L / GGT: x           CARDIAC MARKERS ( 17 Aug 2023 12:44 )  x     / <0.01 ng/mL / x     / x     / x            MEDICATIONS  (STANDING):  aspirin  chewable 81 milliGRAM(s) Oral daily  atorvastatin 80 milliGRAM(s) Oral at bedtime  chlorhexidine 2% Cloths 1 Application(s) Topical <User Schedule>  levothyroxine 250 MICROGram(s) Oral daily  sodium chloride 0.9% lock flush 10 milliLiter(s) IV Push once        RADIOLOGY & ADDITIONAL TESTS:    Imaging Personally Reviewed:  [ ] YES  [ ] NO    A/P:  73  years old male with hx hypothyroid paroxysmal afib off eliquis present for left sided weakness s/p TNK with improvement of symptoms     #Acute Stroke -  s/p TNK    appreciate neuro f/u  2/2 embolic event likely , likely 2/2 P Afib   Tele remains with  no significant arrythmias, couple of PVC`s and couple of artifacts last 24 hrs  - f/u neuro recs, neuro w/u per neuro  - TTE reveals normal LVEF and no signif valve abn  on lipitor 80mg po q24   f/u CTH negative for bleed PLEASE start pt on ASA 81mg po q24  MRI/MRV pending    #paroxsymal Afib   - EKG shows NSR   - is off eliquis per his choice, he says now will take it  - plan to resume eliquis on DC     #hypothyroid- c/w levothyroxine 250mcg. Repeat TSH    #Misc  - DVT Prophylaxis: hold AC, SCD   - Diet:DASH  - Activity:bedrest   - Code Status: FULL CODE    
  Name: TRAMAINE PINEDA  Age: 73y  Gender: Male    Pt was seen and examined.   c/o:  doing great he says, has no complaints, wants to go home he says    Allergies:  No Known Allergies      PHYSICAL EXAM:    Vitals:  T(C): 35.8 (08-18-23 @ 07:01), Max: 37.2 (08-17-23 @ 12:43)  HR: 74 (08-18-23 @ 07:00) (66 - 93)  BP: 124/73 (08-18-23 @ 07:00) (112/58 - 173/101)  RR: 18 (08-18-23 @ 07:01) (14 - 37)  SpO2: 93% (08-18-23 @ 07:00) (89% - 98%)  Wt(kg): --Vital Signs Last 24 Hrs  T(C): 35.8 (18 Aug 2023 07:01), Max: 37.2 (17 Aug 2023 12:43)  T(F): 96.5 (18 Aug 2023 07:01), Max: 98.9 (17 Aug 2023 12:43)  HR: 74 (18 Aug 2023 07:00) (66 - 93)  BP: 124/73 (18 Aug 2023 07:00) (112/58 - 173/101)  BP(mean): 93 (18 Aug 2023 07:00) (78 - 130)  RR: 18 (18 Aug 2023 07:01) (14 - 37)  SpO2: 93% (18 Aug 2023 07:00) (89% - 98%)    Parameters below as of 18 Aug 2023 07:00  Patient On (Oxygen Delivery Method): room air          NECK: Supple, No JVD  CHEST/LUNG: CTA, B/L, No rales, rhonchi, wheezing, or rubs  HEART: S1,S2, N1 Regular rate and rhythm; No murmurs, rubs, or gallops  ABDOMEN: Soft, Nontender, Nondistended; Bowel sounds present  EXTREMITIES:  2+ Peripheral Pulses, No clubbing, cyanosis, or edema  Neuro exam - motor - 5/5 b/l upper and lower extrem, sensation grossly nl      LABS:                        16.2   7.43  )-----------( 291      ( 17 Aug 2023 12:44 )             49.7     08-17    144  |  105  |  15  ----------------------------<  113<H>  4.1   |  27  |  1.4    Ca    9.3      17 Aug 2023 12:44    TPro  6.8  /  Alb  4.3  /  TBili  0.6  /  DBili  x   /  AST  17  /  ALT  17  /  AlkPhos  97  08-17    LIVER FUNCTIONS - ( 17 Aug 2023 12:44 )  Alb: 4.3 g/dL / Pro: 6.8 g/dL / ALK PHOS: 97 U/L / ALT: 17 U/L / AST: 17 U/L / GGT: x           CARDIAC MARKERS ( 17 Aug 2023 12:44 )  x     / <0.01 ng/mL / x     / x     / x            MEDICATIONS  (STANDING):  atorvastatin 80 milliGRAM(s) Oral at bedtime  chlorhexidine 2% Cloths 1 Application(s) Topical <User Schedule>  levothyroxine 250 MICROGram(s) Oral daily  sodium chloride 0.9% lock flush 10 milliLiter(s) IV Push once        RADIOLOGY & ADDITIONAL TESTS:    Imaging Personally Reviewed:  [ ] YES  [ ] NO    A/P:  73  years old male with hx hypothyroid paroxysmal afib off eliquis present for left sided weakness s/p TNK with improvement of symptoms     #Acute Stroke -  s/p TNK    2/2 embolic event likely , likely 2/2 P Afib  EKG-NSR, Tele reveals no significant arrythmias, couple of PVC`s and couple of artifacts  CTH - noted  - Q1 neuro checks , f/u neuro recs, neuro w/u per neuro  - fu echo   on lipitor 80mg po q24     #paroxsymal Afib   - EKG shows NSR   - is off eliquis per his choice, he says now will take it  - plan to resume eliquis on DC     #hypothyroid- c/w levothyroxine 250mcg. Repeat TSH    #Misc  - DVT Prophylaxis: hold AC, SCD   - Diet:DASH  - Activity:bedrest   - Code Status: FULL CODE  
TRAMAINE PINEDA 73y Male  MRN#: [id number]   Hospital Day: 1d    SUBJECTIVE  Patient is a 73y old Male who presents with a chief complaint of r/o cva (18 Aug 2023 08:56)  Currently admitted to medicine with the primary diagnosis of CVA (cerebrovascular accident)      INTERVAL HPI AND OVERNIGHT EVENTS:  Patient was examined and seen at bedside. This morning he is resting comfortably in bed. No issues or overnight events.    OBJECTIVE  PAST MEDICAL & SURGICAL HISTORY  Hypothyroid    History of total knee replacement (TKR)  left      ALLERGIES:  No Known Allergies    MEDICATIONS:  STANDING MEDICATIONS  atorvastatin 80 milliGRAM(s) Oral at bedtime  chlorhexidine 2% Cloths 1 Application(s) Topical <User Schedule>  levothyroxine 250 MICROGram(s) Oral daily  sodium chloride 0.9% lock flush 10 milliLiter(s) IV Push once    PRN MEDICATIONS      VITAL SIGNS: Last 24 Hours  T(C): 35.8 (18 Aug 2023 07:01), Max: 37.2 (17 Aug 2023 12:43)  T(F): 96.5 (18 Aug 2023 07:01), Max: 98.9 (17 Aug 2023 12:43)  HR: 76 (18 Aug 2023 09:00) (66 - 93)  BP: 130/74 (18 Aug 2023 09:00) (102/66 - 173/101)  BP(mean): 94 (18 Aug 2023 09:00) (78 - 130)  RR: 18 (18 Aug 2023 09:00) (14 - 37)  SpO2: 94% (18 Aug 2023 09:00) (89% - 98%)    LABS:                        16.2   7.43  )-----------( 291      ( 17 Aug 2023 12:44 )             49.7     08-17    144  |  105  |  15  ----------------------------<  113<H>  4.1   |  27  |  1.4    Ca    9.3      17 Aug 2023 12:44    TPro  6.8  /  Alb  4.3  /  TBili  0.6  /  DBili  x   /  AST  17  /  ALT  17  /  AlkPhos  97  08-17    PT/INR - ( 17 Aug 2023 12:44 )   PT: 10.60 sec;   INR: 0.93 ratio         PTT - ( 17 Aug 2023 12:44 )  PTT:36.2 sec  Urinalysis Basic - ( 17 Aug 2023 12:44 )    Color: x / Appearance: x / SG: x / pH: x  Gluc: 113 mg/dL / Ketone: x  / Bili: x / Urobili: x   Blood: x / Protein: x / Nitrite: x   Leuk Esterase: x / RBC: x / WBC x   Sq Epi: x / Non Sq Epi: x / Bacteria: x        Troponin T, Serum: <0.01 ng/mL (08-17-23 @ 12:44)      CARDIAC MARKERS ( 17 Aug 2023 12:44 )  x     / <0.01 ng/mL / x     / x     / x          RADIOLOGY:      PHYSICAL EXAM:  CONSTITUTIONAL: No acute distress, well-developed, well-groomed, AAOx3  HEAD: Atraumatic, normocephalic  EYES: EOM intact, PERRLA, conjunctiva and sclera clear  ENT: Supple, no masses, no thyromegaly, no bruits, no JVD; moist mucous membranes  PULMONARY: Clear to auscultation bilaterally; no wheezes, rales, or rhonchi  CARDIOVASCULAR: Regular rate and rhythm; no murmurs, rubs, or gallops  GASTROINTESTINAL: Soft, non-tender, non-distended; bowel sounds present  MUSCULOSKELETAL: 2+ peripheral pulses; no clubbing, no cyanosis, no edema  NEUROLOGY: non-focal  SKIN: No rashes or lesions; warm and dry

## 2023-08-20 ENCOUNTER — TRANSCRIPTION ENCOUNTER (OUTPATIENT)
Age: 73
End: 2023-08-20

## 2023-08-20 VITALS
SYSTOLIC BLOOD PRESSURE: 164 MMHG | RESPIRATION RATE: 18 BRPM | HEART RATE: 78 BPM | DIASTOLIC BLOOD PRESSURE: 78 MMHG | OXYGEN SATURATION: 98 %

## 2023-08-20 LAB
ANION GAP SERPL CALC-SCNC: 10 MMOL/L — SIGNIFICANT CHANGE UP (ref 7–14)
BUN SERPL-MCNC: 18 MG/DL — SIGNIFICANT CHANGE UP (ref 10–20)
CALCIUM SERPL-MCNC: 8.8 MG/DL — SIGNIFICANT CHANGE UP (ref 8.4–10.5)
CHLORIDE SERPL-SCNC: 107 MMOL/L — SIGNIFICANT CHANGE UP (ref 98–110)
CO2 SERPL-SCNC: 25 MMOL/L — SIGNIFICANT CHANGE UP (ref 17–32)
CREAT SERPL-MCNC: 0.9 MG/DL — SIGNIFICANT CHANGE UP (ref 0.7–1.5)
EGFR: 90 ML/MIN/1.73M2 — SIGNIFICANT CHANGE UP
GLUCOSE SERPL-MCNC: 114 MG/DL — HIGH (ref 70–99)
HCT VFR BLD CALC: 45 % — SIGNIFICANT CHANGE UP (ref 42–52)
HGB BLD-MCNC: 14.7 G/DL — SIGNIFICANT CHANGE UP (ref 14–18)
MCHC RBC-ENTMCNC: 30.4 PG — SIGNIFICANT CHANGE UP (ref 27–31)
MCHC RBC-ENTMCNC: 32.7 G/DL — SIGNIFICANT CHANGE UP (ref 32–37)
MCV RBC AUTO: 93 FL — SIGNIFICANT CHANGE UP (ref 80–94)
NRBC # BLD: 0 /100 WBCS — SIGNIFICANT CHANGE UP (ref 0–0)
PLATELET # BLD AUTO: 263 K/UL — SIGNIFICANT CHANGE UP (ref 130–400)
PMV BLD: 9.1 FL — SIGNIFICANT CHANGE UP (ref 7.4–10.4)
POTASSIUM SERPL-MCNC: 4.4 MMOL/L — SIGNIFICANT CHANGE UP (ref 3.5–5)
POTASSIUM SERPL-SCNC: 4.4 MMOL/L — SIGNIFICANT CHANGE UP (ref 3.5–5)
RBC # BLD: 4.84 M/UL — SIGNIFICANT CHANGE UP (ref 4.7–6.1)
RBC # FLD: 13.4 % — SIGNIFICANT CHANGE UP (ref 11.5–14.5)
SODIUM SERPL-SCNC: 142 MMOL/L — SIGNIFICANT CHANGE UP (ref 135–146)
WBC # BLD: 8 K/UL — SIGNIFICANT CHANGE UP (ref 4.8–10.8)
WBC # FLD AUTO: 8 K/UL — SIGNIFICANT CHANGE UP (ref 4.8–10.8)

## 2023-08-20 RX ORDER — LEVOTHYROXINE SODIUM 125 MCG
2 TABLET ORAL
Qty: 0 | Refills: 0 | DISCHARGE
Start: 2023-08-20

## 2023-08-20 RX ORDER — LEVOTHYROXINE SODIUM 125 MCG
2.5 TABLET ORAL
Refills: 0 | DISCHARGE

## 2023-08-20 RX ORDER — ASPIRIN/CALCIUM CARB/MAGNESIUM 324 MG
1 TABLET ORAL
Qty: 0 | Refills: 0 | DISCHARGE
Start: 2023-08-20

## 2023-08-20 RX ORDER — ATORVASTATIN CALCIUM 80 MG/1
1 TABLET, FILM COATED ORAL
Qty: 0 | Refills: 0 | DISCHARGE
Start: 2023-08-20

## 2023-08-20 RX ADMIN — ATORVASTATIN CALCIUM 80 MILLIGRAM(S): 80 TABLET, FILM COATED ORAL at 20:08

## 2023-08-20 RX ADMIN — Medication 81 MILLIGRAM(S): at 13:45

## 2023-08-20 RX ADMIN — CHLORHEXIDINE GLUCONATE 1 APPLICATION(S): 213 SOLUTION TOPICAL at 06:10

## 2023-08-20 RX ADMIN — Medication 250 MICROGRAM(S): at 06:37

## 2023-08-20 NOTE — CHART NOTE - NSCHARTNOTEFT_GEN_A_CORE
Alerted by RN patient with BP outside of parameters.   SBP in 120s   Patient with no change in mental status/ any acute symptoms.     Will order 500cc NS Bolus  F/u Repeat BP     Kurtis HER
Consult received- r/o acute CVA. Pt is s/p thrombolytic- bolus administered at 1322. NPO. SLP to f/u tomorrow when appropriate.
Discussion with: Dr. Walter    HISTORY OF PRESENT ILLNESS:  TRAMAINE Qureshi) (MRN-[id number] ) Patient is a 73y old  Male who presents with a chief complaint of left sided weakness. LKW 1200. NIHSS 2 as per ED physician. Pt has h/o afib, used to be on eliquis but was switched to ASA monotherapy some time ago.       PAST MEDICAL & SURGICAL HISTORY:  Hypothyroid      History of total knee replacement (TKR)  left      Presented to ([facility] ED) on (8/17/23). Last known normal time was (1200).     Pre-stroke MRS= 0   *Modified [place] Score   0 - No symptoms.  1 - No significant disability. Able to carry out all usual activities, despite some symptoms.  2 - Slight disability. Able to look after own affairs without assistance, but unable to carry out all previous activities.  3 - Moderate disability. Requires some help, but able to walk unassisted.  4 - Moderately severe disability. Unable to attend to own bodily needs without assistance, and unable to walk unassisted.  5 - Severe disability. Requires constant nursing care and attention, bedridden, incontinent.    CT HEAD IMAGING RESULTS:  CT Head: no acute process  CTA Neck/CTA Head: No ELVO, however noted moderate to severe proximal-mid R MCA stenosis, possible underlying subocclusive thrombus. Official read pending  CTP: CBF<30%= 0mL                                         Tmax>6 secs=0mL    Labs:  CAPILLARY BLOOD GLUCOSE  124 (17 Aug 2023 12:43)        Platelet Count - Automated: 291 K/uL (08-17-23 @ 12:44)    PT/INR - ( 17 Aug 2023 12:44 )   PT: 10.60 sec;   INR: 0.93 ratio         PTT - ( 17 Aug 2023 12:44 )  PTT:36.2 sec  NEUROLOGIC EXAMINATION deferred – interprofessional audio discussion only     Inclusion Criteria:  Clearly defined time onset within 4.5 hours of treatment Yes [x] No []    Ischemic stroke with a measurable deficit on NIHSS or a non-measurable deficit that is deemed disabling?  Yes [x] no []  or  Unclear time of onset wake-up with stroke symptoms yes [] no[]  If yes:  [] Treatment can be initiated within 4.5 hrs. from symptom recognition  [] MRI can be obtained acutely from the emergency department  [] Not an endovascular stroke therapy candidate  [] Baseline functional independence.  Pre-stroke mRS of 0-1  [] NIHSS less than 26  [] DW-MRI with diffusion-positive. FLAIR – negative lesions indicating timing of stroke onset less than 4.5 hrs.  [] MRI mismatch between abnormal signal on DW-MRI and no visible signal change on FLAIR        Review thrombolytic CONTRAINDICATIONS  [x] None    ABSOLUTE EXCLUSION CRITERIA:  [] Patient outside of the appropriate time window for IV thrombolysis  [] Evidence of intracranial hemorrhage on pretreatment CT scan (CT must be read by an attending radiologist or attending neurologist)  [] Head CT findings suggesting an established acute cerebral infarction as evidenced by tolu hypodensity, regardless of size.  [] Clinical presentation consistent with subarachnoid hemorrhage, even with normal CT scan  [] Active internal bleeding  [] Known bleeding diathesis (including but not limited to platelet count < 100,000, use of oral anticoagulants with INR>1.7, use of full dose low molecular       weight heparin within the last 24 hours, use of unfractionated heparin AND a prolonged PTT (> 40sec), use of direct thrombin inhibitor (e.g. dabigatran) or oral direct Factor Xa inhibitor (e.g. rivaroxaban, apixaban) within 48 hours) with any degree of abnormality on any coagulation test; any DOAC taken within 3 hours of presentation, regardless of coagulation test results  [] Systolic pressure >= 185 mmHg or diastolic pressure 110 mmHg on repeated measurements at the time treatment is to begin  [] Care team unable to determine eligibility for IV thrombolysis  [] Patient, family, or surrogate declines and understands risks and benefits of treatment.  [] For wake-up Protocol patients: DW-MRI lesions larger than one-third of the MCA territory    RELATIVE EXCLUSION CRITERIA  [] Isolated neurological deficits (except for aphasia or hemianopsia)  [] stroke severity too mild (non-disabling)  [] Seizure at onset with post-ictal residual neurological impairment  [] Gastrointestinal, genitourinary or respiratory hemorrhage within 21 days   [] Major surgery within 14 days   [] Blood glucose level < 50 or > 400 mG/dL  [] CPR with chest compressions or minor surgery (including liver and kidney biopsy, thoracocentesis, lumbar puncture) within 10 days   [] Arterial puncture at non-compressible site within 7 days   [] Evidence of acute trauma (fracture)   Significant head trauma or prior stroke in the previous 3 months  History of previous intracranial hemorrhage  Intracranial or intraspinal surgery within past 3 months[] Diabetic hemorrhagic retinopathy or ophthalmic bleeding  [] Pregnancy or peripartum; no nursing post- treatment  [] Post-myocardial infarction pericarditis  [] Peritoneal dialysis or hemodialysis or severe hepatic disease   [] Known bacterial endocarditis   [] Life expectancy less than 6 months or sever co-morbid illness   [] Known cerebral vascular malformation, untreated intracranial aneurysm or brain tumor (may consider IV alteplase in patients with CNS lesions that have a very low likelihood of hemorrhage, such as small un-ruptured aneurysms or benign tumors with low vascularity.  [] Social/Muslim reason  [] Other ____________________    RISKS/BENEFITS DISCUSSION done by ED provider.  RECOMMENDATIONS:  [] decision/agree to treat – 8/17/23 at 1310  [] Administer thrombolytic- bolus administered at 1322  [] During IV thrombolytic administration and post treatment follow the vital sign/neuro check protocol  [] Admit to [facility] ICU for further stroke care    Please use post thrombolytic stroke orderset  Maintain BP <180/105 mmHg; -180  SCD for DVT ppx  No ATP/AC until 24 hr f/u CT head without ICH  STAT CT head for any neuro decline       Plan discussed with _Dr. Walter__ on 8/17/23 at 1307___.      Time spent during discussion (in minutes):__15 min___
Chart reviewed MRI revealed no acute intracranial pathology. No evidence of acute infarct, intracranial hemorrhage or mass effect. Speech & Language WFL and swallow function WFL. SLP d/c reconsult PRN.

## 2023-08-20 NOTE — CHART NOTE - NSCHARTNOTESELECT_GEN_ALL_CORE
Speech & Swallow/Event Note
Event Note
Speech Pathology/Event Note
Telestroke Interprofessional Note

## 2023-08-20 NOTE — DISCHARGE NOTE PROVIDER - NSDCFUADDAPPT_GEN_ALL_CORE_FT
start: aspirin 81g daily, lipitor 80 mg daily  follow w/ Dr Randall 8/29 @ 3pm in his medical office  Dr Randall will send pt to EPS cardiologist for further work up

## 2023-08-20 NOTE — DISCHARGE NOTE PROVIDER - DISCHARGE DATE
Daily Note     Today's date: 2019  Patient name: Ria Garcia  : 1998  MRN: [id number]  Referring provider: Moon Dye  Dx:   Encounter Diagnosis     ICD-10-CM    1  Right hip pain [id number]                   Subjective: Pt reports that her hip is feeling pretty good, but has discomfort when bringing her knee and hip into flexion  Objective: See treatment diary below    Daily Treatment Diary     Manual            Lateral hip mobs grade III HK            Manual hip flex str             PROM R hip HK KO KO          Long axis distraction R LE HK KO KO          Manual hip flexor str 4x30" KO KO              Exercise Diary            Bike 10' 10' 10'          Clams 3x10 3x10 3x10          Bridging 3x30 3x10 3x10          Glute set 5"  x30 5"  x30 5"  x30          Prone quad str   Man          Hip flex str EOT   NP          Ab bracing 5"  x30 5"  x30 5"  x30          Reverse clam shells 3x10 3x10 3x10          Prone hip ext 3x10 3x10 3x10          Glute raises 3x10 3x10 3x10          Piriformis str             Add sq With BR  3x10 With  Br  3x10 With  Br  3x10          Standing hip abd 3x10 3x10 3x10          Stool IR/ER x30 x30 x30          Bridging with t-band abd purp  3x10   purp  3x10 NP          SLS w/alpha NP NP NP          Ab bracing with hip flexion NP NP NP            MHP in L SL  Post PT session - 15 min    Assessment: Tolerated treatment well  Patient demonstrated fatigue post treatment, exhibited good technique with therapeutic exercises and would benefit from continued PT  Manuals and all TE performed within a pain-free range  Plan: Continue per plan of care 
20-Aug-2023

## 2023-08-20 NOTE — DISCHARGE NOTE PROVIDER - NSDCMRMEDTOKEN_GEN_ALL_CORE_FT
aspirin 81 mg oral tablet, chewable: 1 tab(s) orally once a day  atorvastatin 80 mg oral tablet: 1 tab(s) orally once a day (at bedtime)  levothyroxine 125 mcg (0.125 mg) oral tablet: 2 tab(s) orally once a day

## 2023-08-20 NOTE — DISCHARGE NOTE NURSING/CASE MANAGEMENT/SOCIAL WORK - PATIENT PORTAL LINK FT
You can access the FollowMyHealth Patient Portal offered by [facility] by registering at the following website: [email or web address]. By joining [facility]’s FollowMyHealth portal, you will also be able to view your health information using other applications (apps) compatible with our system.

## 2023-08-20 NOTE — DISCHARGE NOTE PROVIDER - NSDCCPCAREPLAN_GEN_ALL_CORE_FT
PRINCIPAL DISCHARGE DIAGNOSIS  Diagnosis: CVA (cerebrovascular accident)  Assessment and Plan of Treatment: start aspirin 81 mg daily and lipitor 80mg daily . follow w/ Dr Randall on tues 8/29/23 at 3pm

## 2023-08-20 NOTE — DISCHARGE NOTE PROVIDER - HOSPITAL COURSE
74yo W pt adm to hospital w/ left sided weakness. Labs unremarkable, imaging shows moderated stenosis rt M1 MCAw/ irregular stenosis of other distal branches. .  Medical decision to give thrombolytics made by pt & neuro on call. F/U MRI did not show acute stroke. Post TNK pt's asymptomatic.  There is no note from neuro today . I discussed case discussed w/ pt's attending , Tonia . who is ok for discharge tonight on aspirin 81mg & lipitor 80mg.  Dr Randall will see pt 8/29/23 @ 3pm.

## 2023-08-23 DIAGNOSIS — Z96.652 PRESENCE OF LEFT ARTIFICIAL KNEE JOINT: ICD-10-CM

## 2023-08-23 DIAGNOSIS — R29.702 NIHSS SCORE 2: ICD-10-CM

## 2023-08-23 DIAGNOSIS — Z98.1 ARTHRODESIS STATUS: ICD-10-CM

## 2023-08-23 DIAGNOSIS — Z79.890 HORMONE REPLACEMENT THERAPY: ICD-10-CM

## 2023-08-23 DIAGNOSIS — I48.0 PAROXYSMAL ATRIAL FIBRILLATION: ICD-10-CM

## 2023-08-23 DIAGNOSIS — I63.511 CEREBRAL INFARCTION DUE TO UNSPECIFIED OCCLUSION OR STENOSIS OF RIGHT MIDDLE CEREBRAL ARTERY: ICD-10-CM

## 2023-08-23 DIAGNOSIS — Z79.01 LONG TERM (CURRENT) USE OF ANTICOAGULANTS: ICD-10-CM

## 2023-08-23 DIAGNOSIS — E03.9 HYPOTHYROIDISM, UNSPECIFIED: ICD-10-CM

## 2023-08-23 DIAGNOSIS — G81.94 HEMIPLEGIA, UNSPECIFIED AFFECTING LEFT NONDOMINANT SIDE: ICD-10-CM

## 2023-08-24 LAB
CULTURE RESULTS: SIGNIFICANT CHANGE UP
SPECIMEN SOURCE: SIGNIFICANT CHANGE UP

## 2023-09-19 ENCOUNTER — APPOINTMENT (OUTPATIENT)
Dept: PODIATRY | Facility: CLINIC | Age: 73
End: 2023-09-19
Payer: MEDICARE

## 2023-09-19 DIAGNOSIS — M76.61 ACHILLES TENDINITIS, RIGHT LEG: ICD-10-CM

## 2023-09-19 PROCEDURE — 99213 OFFICE O/P EST LOW 20 MIN: CPT

## 2023-09-27 PROBLEM — M76.61 ACHILLES TENDINITIS OF RIGHT LOWER EXTREMITY: Status: ACTIVE | Noted: 2023-07-20

## 2023-10-03 ENCOUNTER — APPOINTMENT (OUTPATIENT)
Dept: ELECTROPHYSIOLOGY | Facility: CLINIC | Age: 73
End: 2023-10-03
Payer: MEDICARE

## 2023-10-03 VITALS
HEIGHT: 72 IN | SYSTOLIC BLOOD PRESSURE: 148 MMHG | HEART RATE: 76 BPM | BODY MASS INDEX: 30.88 KG/M2 | WEIGHT: 228 LBS | DIASTOLIC BLOOD PRESSURE: 90 MMHG

## 2023-10-03 DIAGNOSIS — Z78.9 OTHER SPECIFIED HEALTH STATUS: ICD-10-CM

## 2023-10-03 DIAGNOSIS — E03.9 HYPOTHYROIDISM, UNSPECIFIED: ICD-10-CM

## 2023-10-03 PROCEDURE — 93000 ELECTROCARDIOGRAM COMPLETE: CPT

## 2023-10-03 PROCEDURE — 99205 OFFICE O/P NEW HI 60 MIN: CPT

## 2023-10-03 RX ORDER — MELOXICAM 15 MG/1
15 TABLET ORAL DAILY
Qty: 1 | Refills: 4 | Status: DISCONTINUED | COMMUNITY
Start: 2023-07-20 | End: 2023-10-03

## 2023-10-06 ENCOUNTER — APPOINTMENT (OUTPATIENT)
Dept: ELECTROPHYSIOLOGY | Facility: HOSPITAL | Age: 73
End: 2023-10-06

## 2023-10-06 ENCOUNTER — OUTPATIENT (OUTPATIENT)
Dept: OUTPATIENT SERVICES | Facility: HOSPITAL | Age: 73
LOS: 1 days | Discharge: HOME | End: 2023-10-06
Payer: MEDICARE

## 2023-10-06 DIAGNOSIS — I63.9 CEREBRAL INFARCTION, UNSPECIFIED: ICD-10-CM

## 2023-10-06 DIAGNOSIS — Z96.659 PRESENCE OF UNSPECIFIED ARTIFICIAL KNEE JOINT: Chronic | ICD-10-CM

## 2023-10-06 PROCEDURE — 33285 INSJ SUBQ CAR RHYTHM MNTR: CPT

## 2023-10-06 PROCEDURE — C1764: CPT

## 2023-10-06 RX ORDER — CEPHALEXIN 500 MG
500 CAPSULE ORAL ONCE
Refills: 0 | Status: COMPLETED | OUTPATIENT
Start: 2023-10-06 | End: 2023-10-06

## 2023-10-06 RX ADMIN — Medication 500 MILLIGRAM(S): at 08:25

## 2023-10-06 NOTE — H&P ADULT - ASSESSMENT
Patient is a 73y old  Male who presents with a chief complaint of     HPI: 73 year old male presents with the history of AF diagnosed in Aug 2022, retired  and  with history of hypothyroidism, paroxysmal AFib (dx 8/2022 with CHADS VASc 1 not on OAC), and recent admission to [facility] 8/17-8/20/2023 for left sided weakness c/f CVA s/p TNK with improvement of symptoms. Labs were unremarkable. Imaging showed moderated stenosis right M1 MCA w/ irregular stenosis of other distal branches. Patient was given TNK. No definitive stroke evidence. MR brain showed no acute infarct. Of note, patient was hospitalized in Aug 2022 for chest pain and noted to have new onset AFib with RVR that self converted after starting Diltiazem drip. Patient was discharged with Eliquis 5 mg PO BID and Toprol XL 25 mg daily for one month. Now presents for internal loop recorder implant for the purpose of atrial fibrillation management.       PAST MEDICAL & SURGICAL HISTORY:   Achilles tendinitis of right lower extremity (726.71) (M76.61)  History of TIA (transient ischemic attack) (V12.54) (Z86.73)  Hypothyroidism, unspecified type (244.9) (E03.9)  atrial fibrillation   History of total knee replacement (TKR)  left      PREVIOUS DIAGNOSTIC TESTING:    ECG: 10/3/2023 NSR (HR 76 bpm)    Echo: 8/17/2023   1. LV Ejection Fraction by Wisdom's Method with a biplane EF of 63 %.   2. Normal left atrial size.   3. Structurally normal mitral valve, with normal leaflet excursion.   4. Mild tricuspid regurgitation.   5. Mild aortic regurgitation.    Cardiac Cath/PCI: never       Family History    Family history of Other type of myocardial infarction : Father              Social History    Current non-smoker (V49.89) (Z78.9)  Never a smoker  No alcohol use  No illicit drug use  Retired from employment    MEDICATIONS  (STANDING):  cephalexin 500 milliGRAM(s) Oral once   · Atorvastatin Calcium 80 MG Oral Tablet   · Eliquis 5 MG Oral Tablet   · Levothyroxine Sodium 125 MCG Oral Capsule    Past Surgical History: as above    Allergies: No Known Allergies      REVIEW OF SYSTEMS:  CONSTITUTIONAL: No fever, weight loss, chills, shakes, or fatigue  EYES: No eye pain, visual disturbances, or discharge  ENMT:  No difficulty hearing, tinnitus, vertigo; No sinus or throat pain  NECK: No pain or stiffness  BREASTS: No pain, masses, or nipple discharge  RESPIRATORY: No cough, wheezing, hemoptysis, or shortness of breath  CARDIOVASCULAR: No chest pain, dyspnea, palpitations, dizziness, syncope, paroxysmal nocturnal dyspnea, orthopnea, or arm or leg swelling  GASTROINTESTINAL: No abdominal  or epigastric pain, nausea, vomiting, hematemesis, diarrhea, constipation, melena or bright red blood.  GENITOURINARY: No dysuria, nocturia, hematuria, or urinary incontinence  NEUROLOGICAL: No headaches, memory loss, slurred speech, limb weakness, loss of strength, numbness, or tremors  SKIN: No itching, burning, rashes, or lesions   LYMPH NODES: No enlarged glands  ENDOCRINE: No heat or cold intolerance, or hair loss  MUSCULOSKELETAL: No joint pain or swelling, muscle, back, or extremity pain  PSYCHIATRIC: No depression, anxiety, or difficulty sleeping  HEME/LYMPH: No easy bruising or bleeding gums  ALLERY AND IMMUNOLOGIC: No hives or rash.      Vital Signs Last 24 Hrs  T(C): --  T(F): --  HR: --  BP: --  BP(mean): --  RR: --  SpO2: --        PHYSICAL EXAM:  GENERAL: In no apparent distress, well nourished, and hydrated.  HEAD:  Atraumatic, Normocephalic  EYES: EOMI, PERRLA, conjunctiva and sclera clear  ENMT: No tonsillar erythema, exudates, or enlargements; ist mucous membranes, Good dentition, No lesions  NECK: Supple and normal thyroid.  No JVD or carotid bruit.  Carotid pulse is 2+ bilaterally.  HEART: Regular rate and rhythm; No murmurs, rubs, or gallops.  PULMONARY: Clear to auscultation and perfusion.  No rales, wheezing, or rhonchi bilaterally.  ABDOMEN: Soft, Nontender, Nondistended; Bowel sounds present  EXTREMITIES:  2+ Peripheral Pulses, No clubbing, cyanosis, or edema  LYMPH: No lymphadenopathy noted  NEUROLOGICAL: Grossly nonfocal    I&O's Detail    LABS:    BNP  I&O's Detail    Daily     Daily     RADIOLOGY & ADDITIONAL STUDIES:      Assessment and Plan:   73 year old male presents with the history of AF diagnosed in Aug 2022, retired  and  with history of hypothyroidism, paroxysmal AFib (dx 8/2022 with CHADS VASc 1 not on OAC), and recent admission to [facility] 8/17-8/20/2023 for left sided weakness c/f CVA s/p TNK with improvement of symptoms. Labs were unremarkable. Imaging showed moderated stenosis right M1 MCA w/ irregular stenosis of other distal branches. Patient was given TNK. No definitive stroke evidence. MR brain showed no acute infarct. Of note, patient was hospitalized in Aug 2022 for chest pain and noted to have new onset AFib with RVR that self converted after starting Diltiazem drip. Patient was discharged with Eliquis 5 mg PO BID and Toprol XL 25 mg daily for one month. Now presents for internal loop recorder implant for the purpose of atrial fibrillation management.     - Proceed with internal loop recorder implant

## 2023-10-06 NOTE — ASU PATIENT PROFILE, ADULT - FALL HARM RISK - UNIVERSAL INTERVENTIONS
Bed in lowest position, wheels locked, appropriate side rails in place/Call bell, personal items and telephone in reach/Instruct patient to call for assistance before getting out of bed or chair/Non-slip footwear when patient is out of bed/[place] to call system/Physically safe environment - no spills, clutter or unnecessary equipment/Purposeful Proactive Rounding/Room/bathroom lighting operational, light cord in reach

## 2023-10-06 NOTE — H&P ADULT - HISTORY OF PRESENT ILLNESS
73 year old male presents with the history of AF diagnosed in Aug 2022, retired  and  with history of hypothyroidism, paroxysmal AFib (dx 8/2022 with CHADS VASc 1 not on OAC), and recent admission to [facility] 8/17-8/20/2023 for left sided weakness c/f CVA s/p TNK with improvement of symptoms. Labs were unremarkable. Imaging showed moderated stenosis right M1 MCA w/ irregular stenosis of other distal branches. Patient was given TNK. No definitive stroke evidence. MR brain showed no acute infarct. Of note, patient was hospitalized in Aug 2022 for chest pain and noted to have new onset AFib with RVR that self converted after starting Diltiazem drip. Patient was discharged with Eliquis 5 mg PO BID and Toprol XL 25 mg daily for one month. Now presents for internal loop recorder implant for the purpose of atrial fibrillation management.

## 2023-10-06 NOTE — H&P ADULT - NSHPPOADEEPVENOUSTHROMB_GEN_A_CORE
Problem: Ventilation, Mechanical Invasive (Adult)  Goal: Signs and Symptoms of Listed Potential Problems Will be Absent, Minimized or Managed (Ventilation, Mechanical Invasive)  Outcome: Ongoing (interventions implemented as appropriate)   11/02/19 2794   Goal/Outcome Evaluation   Problems Assessed (Mechanical Ventilation, Invasive) all   Problems Present (Mech Vent, Invasive) none          no

## 2023-10-06 NOTE — CHART NOTE - NSCHARTNOTEFT_GEN_A_CORE
Electrophysiology Brief Post-Op Note    I have personally seen and examined the patient.  I agree with the history and physical which I have reviewed and noted any changes below.  10-06-23 @ 08:53    PRE-OP DIAGNOSIS: AF    POST-OP DIAGNOSIS: AF    PROCEDURE: Loop Implant    Physician: Katarina Kiran MD    Assistant: TAINA Solis     ESTIMATED BLOOD LOSS:  1 mL    ANESTHESIA TYPE:  [  ]General Anesthesia  [  ] Sedation  [X  ] Local/Regional    CONDITION  [  ] Critical  [  ] Serious  [  ]Fair  [ X ]Good    SPECIMENS REMOVED (IF APPLICABLE):  none    IMPLANTS (IF APPLICABLE)  Loop Recorder (Medtronic)  [id number]  R-wave 0.25mV    FINDINGS  PLAN OF CARE  - F/U 3-4 weeks  - May remove bandaid in 2 days  - May shower in 48 hours  - Do not get site wet for 7 days Electrophysiology Brief Post-Op Note    I have personally seen and examined the patient.  I agree with the history and physical which I have reviewed and noted any changes below.  10-06-23 @ 08:00    PRE-OP DIAGNOSIS: AF    POST-OP DIAGNOSIS: AF    PROCEDURE: Loop Implant    Physician: Katarina Kiran MD    Assistant: TAINA Solis     ESTIMATED BLOOD LOSS:  2 mL    ANESTHESIA TYPE:  [  ]General Anesthesia  [  ] Sedation  [X  ] Local/Regional    CONDITION  [  ] Critical  [  ] Serious  [  ]Fair  [ X ]Good    SPECIMENS REMOVED (IF APPLICABLE):  none    IMPLANTS (IF APPLICABLE)  Loop Recorder (Med[employer]k)  [id number]  R-wave 0.25 mV    FINDINGS  PLAN OF CARE  - F/U 3-4 weeks  - May remove bandaid in 2 days  - May shower in 48 hours  - Do not get site wet for 2 days

## 2023-10-10 DIAGNOSIS — I48.91 UNSPECIFIED ATRIAL FIBRILLATION: ICD-10-CM

## 2023-10-20 ENCOUNTER — APPOINTMENT (OUTPATIENT)
Dept: ELECTROPHYSIOLOGY | Facility: CLINIC | Age: 73
End: 2023-10-20
Payer: MEDICARE

## 2023-10-20 VITALS
DIASTOLIC BLOOD PRESSURE: 74 MMHG | HEIGHT: 72 IN | WEIGHT: 227 LBS | BODY MASS INDEX: 30.75 KG/M2 | SYSTOLIC BLOOD PRESSURE: 138 MMHG | TEMPERATURE: 98 F

## 2023-10-20 PROCEDURE — 99213 OFFICE O/P EST LOW 20 MIN: CPT

## 2023-10-20 PROCEDURE — 93291 INTERROG DEV EVAL SCRMS IP: CPT

## 2023-10-20 RX ORDER — ATORVASTATIN CALCIUM 80 MG/1
80 TABLET, FILM COATED ORAL
Refills: 0 | Status: ACTIVE | COMMUNITY

## 2023-10-20 RX ORDER — LEVOTHYROXINE SODIUM 125 UG/1
125 CAPSULE ORAL DAILY
Refills: 0 | Status: ACTIVE | COMMUNITY

## 2023-10-20 RX ORDER — APIXABAN 5 MG/1
5 TABLET, FILM COATED ORAL TWICE DAILY
Refills: 0 | Status: ACTIVE | COMMUNITY

## 2023-11-21 ENCOUNTER — APPOINTMENT (OUTPATIENT)
Dept: CARDIOLOGY | Facility: CLINIC | Age: 73
End: 2023-11-21

## 2023-11-21 ENCOUNTER — APPOINTMENT (OUTPATIENT)
Dept: CARDIOLOGY | Facility: CLINIC | Age: 73
End: 2023-11-21
Payer: MEDICARE

## 2023-11-21 ENCOUNTER — NON-APPOINTMENT (OUTPATIENT)
Age: 73
End: 2023-11-21

## 2023-11-21 PROCEDURE — G2066: CPT | Mod: NC

## 2023-11-21 PROCEDURE — 93298 REM INTERROG DEV EVAL SCRMS: CPT

## 2023-12-22 ENCOUNTER — EMERGENCY (EMERGENCY)
Facility: HOSPITAL | Age: 73
LOS: 0 days | Discharge: LEFT BEFORE DISCHARGE | End: 2023-12-22
Attending: EMERGENCY MEDICINE
Payer: MEDICARE

## 2023-12-22 VITALS
SYSTOLIC BLOOD PRESSURE: 119 MMHG | HEART RATE: 115 BPM | TEMPERATURE: 100 F | RESPIRATION RATE: 20 BRPM | OXYGEN SATURATION: 94 % | DIASTOLIC BLOOD PRESSURE: 71 MMHG | WEIGHT: 244.93 LBS

## 2023-12-22 VITALS
SYSTOLIC BLOOD PRESSURE: 125 MMHG | OXYGEN SATURATION: 97 % | DIASTOLIC BLOOD PRESSURE: 82 MMHG | RESPIRATION RATE: 18 BRPM | HEART RATE: 87 BPM

## 2023-12-22 DIAGNOSIS — Z86.73 PERSONAL HISTORY OF TRANSIENT ISCHEMIC ATTACK (TIA), AND CEREBRAL INFARCTION WITHOUT RESIDUAL DEFICITS: ICD-10-CM

## 2023-12-22 DIAGNOSIS — Z96.659 PRESENCE OF UNSPECIFIED ARTIFICIAL KNEE JOINT: Chronic | ICD-10-CM

## 2023-12-22 DIAGNOSIS — R53.1 WEAKNESS: ICD-10-CM

## 2023-12-22 DIAGNOSIS — U07.1 COVID-19: ICD-10-CM

## 2023-12-22 DIAGNOSIS — Z53.29 PROCEDURE AND TREATMENT NOT CARRIED OUT BECAUSE OF PATIENT'S DECISION FOR OTHER REASONS: ICD-10-CM

## 2023-12-22 DIAGNOSIS — R50.9 FEVER, UNSPECIFIED: ICD-10-CM

## 2023-12-22 DIAGNOSIS — E03.9 HYPOTHYROIDISM, UNSPECIFIED: ICD-10-CM

## 2023-12-22 LAB
ALBUMIN SERPL ELPH-MCNC: 4.1 G/DL — SIGNIFICANT CHANGE UP (ref 3.5–5.2)
ALBUMIN SERPL ELPH-MCNC: 4.1 G/DL — SIGNIFICANT CHANGE UP (ref 3.5–5.2)
ALP SERPL-CCNC: 93 U/L — SIGNIFICANT CHANGE UP (ref 30–115)
ALP SERPL-CCNC: 93 U/L — SIGNIFICANT CHANGE UP (ref 30–115)
ALT FLD-CCNC: 22 U/L — SIGNIFICANT CHANGE UP (ref 0–41)
ALT FLD-CCNC: 22 U/L — SIGNIFICANT CHANGE UP (ref 0–41)
ANION GAP SERPL CALC-SCNC: 11 MMOL/L — SIGNIFICANT CHANGE UP (ref 7–14)
ANION GAP SERPL CALC-SCNC: 11 MMOL/L — SIGNIFICANT CHANGE UP (ref 7–14)
APPEARANCE UR: CLEAR — SIGNIFICANT CHANGE UP
APPEARANCE UR: CLEAR — SIGNIFICANT CHANGE UP
APTT BLD: 39.6 SEC — HIGH (ref 27–39.2)
APTT BLD: 39.6 SEC — HIGH (ref 27–39.2)
AST SERPL-CCNC: 20 U/L — SIGNIFICANT CHANGE UP (ref 0–41)
AST SERPL-CCNC: 20 U/L — SIGNIFICANT CHANGE UP (ref 0–41)
BASE EXCESS BLDV CALC-SCNC: 4.8 MMOL/L — HIGH (ref -2–3)
BASE EXCESS BLDV CALC-SCNC: 4.8 MMOL/L — HIGH (ref -2–3)
BASOPHILS # BLD AUTO: 0.06 K/UL — SIGNIFICANT CHANGE UP (ref 0–0.2)
BASOPHILS # BLD AUTO: 0.06 K/UL — SIGNIFICANT CHANGE UP (ref 0–0.2)
BASOPHILS NFR BLD AUTO: 0.6 % — SIGNIFICANT CHANGE UP (ref 0–1)
BASOPHILS NFR BLD AUTO: 0.6 % — SIGNIFICANT CHANGE UP (ref 0–1)
BILIRUB SERPL-MCNC: 0.8 MG/DL — SIGNIFICANT CHANGE UP (ref 0.2–1.2)
BILIRUB SERPL-MCNC: 0.8 MG/DL — SIGNIFICANT CHANGE UP (ref 0.2–1.2)
BILIRUB UR-MCNC: NEGATIVE — SIGNIFICANT CHANGE UP
BILIRUB UR-MCNC: NEGATIVE — SIGNIFICANT CHANGE UP
BUN SERPL-MCNC: 11 MG/DL — SIGNIFICANT CHANGE UP (ref 10–20)
BUN SERPL-MCNC: 11 MG/DL — SIGNIFICANT CHANGE UP (ref 10–20)
CA-I SERPL-SCNC: 1.14 MMOL/L — LOW (ref 1.15–1.33)
CA-I SERPL-SCNC: 1.14 MMOL/L — LOW (ref 1.15–1.33)
CALCIUM SERPL-MCNC: 8.8 MG/DL — SIGNIFICANT CHANGE UP (ref 8.4–10.5)
CALCIUM SERPL-MCNC: 8.8 MG/DL — SIGNIFICANT CHANGE UP (ref 8.4–10.5)
CHLORIDE SERPL-SCNC: 102 MMOL/L — SIGNIFICANT CHANGE UP (ref 98–110)
CHLORIDE SERPL-SCNC: 102 MMOL/L — SIGNIFICANT CHANGE UP (ref 98–110)
CO2 SERPL-SCNC: 27 MMOL/L — SIGNIFICANT CHANGE UP (ref 17–32)
CO2 SERPL-SCNC: 27 MMOL/L — SIGNIFICANT CHANGE UP (ref 17–32)
COLOR SPEC: YELLOW — SIGNIFICANT CHANGE UP
COLOR SPEC: YELLOW — SIGNIFICANT CHANGE UP
CREAT SERPL-MCNC: 1.3 MG/DL — SIGNIFICANT CHANGE UP (ref 0.7–1.5)
CREAT SERPL-MCNC: 1.3 MG/DL — SIGNIFICANT CHANGE UP (ref 0.7–1.5)
DIFF PNL FLD: ABNORMAL
DIFF PNL FLD: ABNORMAL
EGFR: 58 ML/MIN/1.73M2 — LOW
EGFR: 58 ML/MIN/1.73M2 — LOW
EOSINOPHIL # BLD AUTO: 0.09 K/UL — SIGNIFICANT CHANGE UP (ref 0–0.7)
EOSINOPHIL # BLD AUTO: 0.09 K/UL — SIGNIFICANT CHANGE UP (ref 0–0.7)
EOSINOPHIL NFR BLD AUTO: 1 % — SIGNIFICANT CHANGE UP (ref 0–8)
EOSINOPHIL NFR BLD AUTO: 1 % — SIGNIFICANT CHANGE UP (ref 0–8)
EPI CELLS # UR: SIGNIFICANT CHANGE UP
EPI CELLS # UR: SIGNIFICANT CHANGE UP
FLUAV AG NPH QL: SIGNIFICANT CHANGE UP
FLUAV AG NPH QL: SIGNIFICANT CHANGE UP
FLUBV AG NPH QL: SIGNIFICANT CHANGE UP
FLUBV AG NPH QL: SIGNIFICANT CHANGE UP
GAS PNL BLDV: 135 MMOL/L — LOW (ref 136–145)
GAS PNL BLDV: 135 MMOL/L — LOW (ref 136–145)
GAS PNL BLDV: SIGNIFICANT CHANGE UP
GAS PNL BLDV: SIGNIFICANT CHANGE UP
GLUCOSE SERPL-MCNC: 141 MG/DL — HIGH (ref 70–99)
GLUCOSE SERPL-MCNC: 141 MG/DL — HIGH (ref 70–99)
GLUCOSE UR QL: NEGATIVE MG/DL — SIGNIFICANT CHANGE UP
GLUCOSE UR QL: NEGATIVE MG/DL — SIGNIFICANT CHANGE UP
HCO3 BLDV-SCNC: 31 MMOL/L — HIGH (ref 22–29)
HCO3 BLDV-SCNC: 31 MMOL/L — HIGH (ref 22–29)
HCT VFR BLD CALC: 46.9 % — SIGNIFICANT CHANGE UP (ref 42–52)
HCT VFR BLD CALC: 46.9 % — SIGNIFICANT CHANGE UP (ref 42–52)
HCT VFR BLDA CALC: 48 % — SIGNIFICANT CHANGE UP (ref 39–51)
HCT VFR BLDA CALC: 48 % — SIGNIFICANT CHANGE UP (ref 39–51)
HGB BLD CALC-MCNC: 15.9 G/DL — SIGNIFICANT CHANGE UP (ref 12.6–17.4)
HGB BLD CALC-MCNC: 15.9 G/DL — SIGNIFICANT CHANGE UP (ref 12.6–17.4)
HGB BLD-MCNC: 15.5 G/DL — SIGNIFICANT CHANGE UP (ref 14–18)
HGB BLD-MCNC: 15.5 G/DL — SIGNIFICANT CHANGE UP (ref 14–18)
IMM GRANULOCYTES NFR BLD AUTO: 0.3 % — SIGNIFICANT CHANGE UP (ref 0.1–0.3)
IMM GRANULOCYTES NFR BLD AUTO: 0.3 % — SIGNIFICANT CHANGE UP (ref 0.1–0.3)
INR BLD: 1.48 RATIO — HIGH (ref 0.65–1.3)
INR BLD: 1.48 RATIO — HIGH (ref 0.65–1.3)
KETONES UR-MCNC: NEGATIVE MG/DL — SIGNIFICANT CHANGE UP
KETONES UR-MCNC: NEGATIVE MG/DL — SIGNIFICANT CHANGE UP
LACTATE BLDV-MCNC: 2.8 MMOL/L — HIGH (ref 0.5–2)
LACTATE BLDV-MCNC: 2.8 MMOL/L — HIGH (ref 0.5–2)
LEUKOCYTE ESTERASE UR-ACNC: NEGATIVE — SIGNIFICANT CHANGE UP
LEUKOCYTE ESTERASE UR-ACNC: NEGATIVE — SIGNIFICANT CHANGE UP
LYMPHOCYTES # BLD AUTO: 0.72 K/UL — LOW (ref 1.2–3.4)
LYMPHOCYTES # BLD AUTO: 0.72 K/UL — LOW (ref 1.2–3.4)
LYMPHOCYTES # BLD AUTO: 7.6 % — LOW (ref 20.5–51.1)
LYMPHOCYTES # BLD AUTO: 7.6 % — LOW (ref 20.5–51.1)
MCHC RBC-ENTMCNC: 30.5 PG — SIGNIFICANT CHANGE UP (ref 27–31)
MCHC RBC-ENTMCNC: 30.5 PG — SIGNIFICANT CHANGE UP (ref 27–31)
MCHC RBC-ENTMCNC: 33 G/DL — SIGNIFICANT CHANGE UP (ref 32–37)
MCHC RBC-ENTMCNC: 33 G/DL — SIGNIFICANT CHANGE UP (ref 32–37)
MCV RBC AUTO: 92.3 FL — SIGNIFICANT CHANGE UP (ref 80–94)
MCV RBC AUTO: 92.3 FL — SIGNIFICANT CHANGE UP (ref 80–94)
MONOCYTES # BLD AUTO: 1.24 K/UL — HIGH (ref 0.1–0.6)
MONOCYTES # BLD AUTO: 1.24 K/UL — HIGH (ref 0.1–0.6)
MONOCYTES NFR BLD AUTO: 13.1 % — HIGH (ref 1.7–9.3)
MONOCYTES NFR BLD AUTO: 13.1 % — HIGH (ref 1.7–9.3)
NEUTROPHILS # BLD AUTO: 7.3 K/UL — HIGH (ref 1.4–6.5)
NEUTROPHILS # BLD AUTO: 7.3 K/UL — HIGH (ref 1.4–6.5)
NEUTROPHILS NFR BLD AUTO: 77.4 % — HIGH (ref 42.2–75.2)
NEUTROPHILS NFR BLD AUTO: 77.4 % — HIGH (ref 42.2–75.2)
NITRITE UR-MCNC: NEGATIVE — SIGNIFICANT CHANGE UP
NITRITE UR-MCNC: NEGATIVE — SIGNIFICANT CHANGE UP
NRBC # BLD: 0 /100 WBCS — SIGNIFICANT CHANGE UP (ref 0–0)
NRBC # BLD: 0 /100 WBCS — SIGNIFICANT CHANGE UP (ref 0–0)
PCO2 BLDV: 50 MMHG — SIGNIFICANT CHANGE UP (ref 42–55)
PCO2 BLDV: 50 MMHG — SIGNIFICANT CHANGE UP (ref 42–55)
PH BLDV: 7.4 — SIGNIFICANT CHANGE UP (ref 7.32–7.43)
PH BLDV: 7.4 — SIGNIFICANT CHANGE UP (ref 7.32–7.43)
PH UR: 6 — SIGNIFICANT CHANGE UP (ref 5–8)
PH UR: 6 — SIGNIFICANT CHANGE UP (ref 5–8)
PLATELET # BLD AUTO: 241 K/UL — SIGNIFICANT CHANGE UP (ref 130–400)
PLATELET # BLD AUTO: 241 K/UL — SIGNIFICANT CHANGE UP (ref 130–400)
PMV BLD: 9.5 FL — SIGNIFICANT CHANGE UP (ref 7.4–10.4)
PMV BLD: 9.5 FL — SIGNIFICANT CHANGE UP (ref 7.4–10.4)
PO2 BLDV: 28 MMHG — SIGNIFICANT CHANGE UP (ref 25–45)
PO2 BLDV: 28 MMHG — SIGNIFICANT CHANGE UP (ref 25–45)
POTASSIUM BLDV-SCNC: 4.1 MMOL/L — SIGNIFICANT CHANGE UP (ref 3.5–5.1)
POTASSIUM BLDV-SCNC: 4.1 MMOL/L — SIGNIFICANT CHANGE UP (ref 3.5–5.1)
POTASSIUM SERPL-MCNC: 4.1 MMOL/L — SIGNIFICANT CHANGE UP (ref 3.5–5)
POTASSIUM SERPL-MCNC: 4.1 MMOL/L — SIGNIFICANT CHANGE UP (ref 3.5–5)
POTASSIUM SERPL-SCNC: 4.1 MMOL/L — SIGNIFICANT CHANGE UP (ref 3.5–5)
POTASSIUM SERPL-SCNC: 4.1 MMOL/L — SIGNIFICANT CHANGE UP (ref 3.5–5)
PROT SERPL-MCNC: 6.3 G/DL — SIGNIFICANT CHANGE UP (ref 6–8)
PROT SERPL-MCNC: 6.3 G/DL — SIGNIFICANT CHANGE UP (ref 6–8)
PROT UR-MCNC: 30 MG/DL
PROT UR-MCNC: 30 MG/DL
PROTHROM AB SERPL-ACNC: 16.9 SEC — HIGH (ref 9.95–12.87)
PROTHROM AB SERPL-ACNC: 16.9 SEC — HIGH (ref 9.95–12.87)
RBC # BLD: 5.08 M/UL — SIGNIFICANT CHANGE UP (ref 4.7–6.1)
RBC # BLD: 5.08 M/UL — SIGNIFICANT CHANGE UP (ref 4.7–6.1)
RBC # FLD: 13 % — SIGNIFICANT CHANGE UP (ref 11.5–14.5)
RBC # FLD: 13 % — SIGNIFICANT CHANGE UP (ref 11.5–14.5)
RBC CASTS # UR COMP ASSIST: 8 /HPF — HIGH (ref 0–4)
RBC CASTS # UR COMP ASSIST: 8 /HPF — HIGH (ref 0–4)
RSV RNA NPH QL NAA+NON-PROBE: SIGNIFICANT CHANGE UP
RSV RNA NPH QL NAA+NON-PROBE: SIGNIFICANT CHANGE UP
SAO2 % BLDV: 45.3 % — LOW (ref 67–88)
SAO2 % BLDV: 45.3 % — LOW (ref 67–88)
SARS-COV-2 RNA SPEC QL NAA+PROBE: DETECTED
SARS-COV-2 RNA SPEC QL NAA+PROBE: DETECTED
SODIUM SERPL-SCNC: 140 MMOL/L — SIGNIFICANT CHANGE UP (ref 135–146)
SODIUM SERPL-SCNC: 140 MMOL/L — SIGNIFICANT CHANGE UP (ref 135–146)
SP GR SPEC: 1.03 — SIGNIFICANT CHANGE UP (ref 1–1.03)
SP GR SPEC: 1.03 — SIGNIFICANT CHANGE UP (ref 1–1.03)
TROPONIN SAMPLING TIME: 2034 — SIGNIFICANT CHANGE UP
TROPONIN SAMPLING TIME: 2034 — SIGNIFICANT CHANGE UP
TROPONIN T, HIGH SENSITIVITY RESULT: 20 NG/L — SIGNIFICANT CHANGE UP (ref 6–21)
TROPONIN T, HIGH SENSITIVITY RESULT: 20 NG/L — SIGNIFICANT CHANGE UP (ref 6–21)
UROBILINOGEN FLD QL: 0.2 MG/DL — SIGNIFICANT CHANGE UP (ref 0.2–1)
UROBILINOGEN FLD QL: 0.2 MG/DL — SIGNIFICANT CHANGE UP (ref 0.2–1)
WBC # BLD: 9.44 K/UL — SIGNIFICANT CHANGE UP (ref 4.8–10.8)
WBC # BLD: 9.44 K/UL — SIGNIFICANT CHANGE UP (ref 4.8–10.8)
WBC # FLD AUTO: 9.44 K/UL — SIGNIFICANT CHANGE UP (ref 4.8–10.8)
WBC # FLD AUTO: 9.44 K/UL — SIGNIFICANT CHANGE UP (ref 4.8–10.8)
WBC UR QL: 4 /HPF — SIGNIFICANT CHANGE UP (ref 0–5)
WBC UR QL: 4 /HPF — SIGNIFICANT CHANGE UP (ref 0–5)

## 2023-12-22 PROCEDURE — 93005 ELECTROCARDIOGRAM TRACING: CPT

## 2023-12-22 PROCEDURE — 84484 ASSAY OF TROPONIN QUANT: CPT

## 2023-12-22 PROCEDURE — 71045 X-RAY EXAM CHEST 1 VIEW: CPT

## 2023-12-22 PROCEDURE — 93010 ELECTROCARDIOGRAM REPORT: CPT

## 2023-12-22 PROCEDURE — 99284 EMERGENCY DEPT VISIT MOD MDM: CPT | Mod: FS

## 2023-12-22 PROCEDURE — 99285 EMERGENCY DEPT VISIT HI MDM: CPT | Mod: 25

## 2023-12-22 PROCEDURE — 85610 PROTHROMBIN TIME: CPT

## 2023-12-22 PROCEDURE — 85014 HEMATOCRIT: CPT

## 2023-12-22 PROCEDURE — 71045 X-RAY EXAM CHEST 1 VIEW: CPT | Mod: 26

## 2023-12-22 PROCEDURE — 36415 COLL VENOUS BLD VENIPUNCTURE: CPT

## 2023-12-22 PROCEDURE — 82330 ASSAY OF CALCIUM: CPT

## 2023-12-22 PROCEDURE — 84295 ASSAY OF SERUM SODIUM: CPT

## 2023-12-22 PROCEDURE — 83605 ASSAY OF LACTIC ACID: CPT

## 2023-12-22 PROCEDURE — 82803 BLOOD GASES ANY COMBINATION: CPT

## 2023-12-22 PROCEDURE — 85025 COMPLETE CBC W/AUTO DIFF WBC: CPT

## 2023-12-22 PROCEDURE — 87086 URINE CULTURE/COLONY COUNT: CPT

## 2023-12-22 PROCEDURE — 85730 THROMBOPLASTIN TIME PARTIAL: CPT

## 2023-12-22 PROCEDURE — 81001 URINALYSIS AUTO W/SCOPE: CPT

## 2023-12-22 PROCEDURE — 87077 CULTURE AEROBIC IDENTIFY: CPT

## 2023-12-22 PROCEDURE — 85018 HEMOGLOBIN: CPT

## 2023-12-22 PROCEDURE — 84132 ASSAY OF SERUM POTASSIUM: CPT

## 2023-12-22 PROCEDURE — 80053 COMPREHEN METABOLIC PANEL: CPT

## 2023-12-22 PROCEDURE — 0241U: CPT

## 2023-12-22 PROCEDURE — 87186 SC STD MICRODIL/AGAR DIL: CPT

## 2023-12-22 PROCEDURE — 87040 BLOOD CULTURE FOR BACTERIA: CPT | Mod: 59

## 2023-12-22 RX ORDER — ACETAMINOPHEN 500 MG
975 TABLET ORAL ONCE
Refills: 0 | Status: COMPLETED | OUTPATIENT
Start: 2023-12-22 | End: 2023-12-22

## 2023-12-22 RX ORDER — SODIUM CHLORIDE 9 MG/ML
1000 INJECTION, SOLUTION INTRAVENOUS ONCE
Refills: 0 | Status: COMPLETED | OUTPATIENT
Start: 2023-12-22 | End: 2023-12-22

## 2023-12-22 RX ADMIN — Medication 975 MILLIGRAM(S): at 20:41

## 2023-12-22 RX ADMIN — SODIUM CHLORIDE 1000 MILLILITER(S): 9 INJECTION, SOLUTION INTRAVENOUS at 20:41

## 2023-12-22 NOTE — ED PROVIDER NOTE - OBJECTIVE STATEMENT
72 yo male, pmh of hypothyroidism, cvam presents to er for weakness, noted fever today, generalized, mild, no specific pain radiation. Denies chills, cp, sob, neck pain, visual changes, nvd, dizziness, numbness, tingling.

## 2023-12-22 NOTE — ED ADULT NURSE NOTE - NSFALLUNIVINTERV_ED_ALL_ED
Bed/Stretcher in lowest position, wheels locked, appropriate side rails in place/Call bell, personal items and telephone in reach/Instruct patient to call for assistance before getting out of bed/chair/stretcher/Non-slip footwear applied when patient is off stretcher/[place] to call system/Physically safe environment - no spills, clutter or unnecessary equipment/Purposeful proactive rounding/Room/bathroom lighting operational, light cord in reach Bed/Stretcher in lowest position, wheels locked, appropriate side rails in place/Call bell, personal items and telephone in reach/Instruct patient to call for assistance before getting out of bed/chair/stretcher/Non-slip footwear applied when patient is off stretcher/[place] to call system/Physically safe environment - no spills, clutter or unnecessary equipment/Purposeful proactive rounding/Room/bathroom lighting operational, light cord in reach

## 2023-12-22 NOTE — ED PROVIDER NOTE - NSFOLLOWUPINSTRUCTIONS_ED_ALL_ED_FT
Novel Coronavirus (COVID-19)  The Facts  What is a coronavirus?  Coronaviruses are a large family of viruses that cause illnesses ranging from the common cold  to more severe diseases such as Middle East Respiratory Syndrome (MERS) and Severe Acute  Respiratory Syndrome (SARS).  What is Novel Coronavirus (COVID-19)?  COVID-19 is a new strain of Coronavirus that has not been previously identified in humans. COVID-19  was identified in [place], [place], [place] in December 2019 (COVID-19). COVID-19 has  since been identified outside of [place], in a growing number of countries internationally, including  the [place].  Where can I find the most recent information about COVID-19?  The Centers for Disease Control and Prevention (CDC) is closely monitoring the outbreak caused by the  COVID-19. For the latest information about COVID- 19, visit the CDC website at  https://www.cdc.gov/coronavirus/index.html  How are coronaviruses spread?  Coronaviruses can be transmitted from person-to- person, usually after close contact with an infected person,  for example, in a household, workplace, or healthcare setting via droplets that become airborne after a cough  or sneeze by an affected person. These droplets can then infect a nearby person. It is likely transmission also  occurs by touching recently contaminated surfaces.  What are the symptoms of coronavirus infection?  It depends on the virus, but common signs include fever and/or respiratory symptoms such as  cough and shortness of breath. In more severe cases, infection can cause pneumonia, severe acute  respiratory syndrome, kidney failure and even death. Fortunately, most cases of COVID-19 have an  illness no different than the influenza “flu”. With a majority of these patients having mild symptoms  and overall mortality which appears to be not much different than the flu.  Is there a treatment for a COVID-19?  There is no specific treatment for disease caused by COVID-19. However, many of the symptoms can  be treated based on the patient’s clinical condition. Supportive care for infected persons can be highly  effective.  What can I do to protect myself?  Washing your hands, covering your cough, and disinfecting surfaces are the best precautionary  measures. It is also advisable to avoid close contact with anyone showing symptoms of respiratory  illness such as coughing and sneezing. Those with symptoms should wear a surgical mask when  around others.  What can I do to protect those around me?  If you have been identified as someone who may be infected with COVID-19, we recommend you  follow the self-isolation procedures outlined below to protect those around you and limit the spread  of this virus.   March 3, 2020  Recommendations for Patients Advised to Self-Isolate  for Possible COVID-19 Exposure  We recommend the below precautionary steps from now until 14 days from when you  returned from your travel or date of your last known possible contact:  - Do not go to work, school, or public areas. Avoid using public transportation, ride-sharing, or  taxis.  - As much as possible, separate yourself from other people in your home. If you can, you should  stay in a room and away from other people in your home. Also, you should use a separate  bathroom, if available.  - Wear the supplied mask whenever you are around other people.  - If you have a non-urgent medical appointment, please reschedule for a later date. If the  appointment is urgent, please call the healthcare provider and tell them that you are on selfisolation for possible COVID-19. This will help the healthcare provider’s office take steps to keep  other people from getting infected or exposed. If you can reschedule routine appointments, do  so.  - Wash your hands often with soap and water for at least 15 to 20 seconds or clean your hands  with an alcohol-based hand  that contains 60 to 95% alcohol, covering all surfaces of  your hands and rubbing them together until they feel dry. Soap and water should be used  preferentially if hands are visibly dirty.  - Cover your mouth and nose with a tissue when you cough or sneeze. Throw used tissues in a  lined trash can; immediately wash your hands.  - Avoid touching your eyes, nose, and mouth with your hands.  - Avoid sharing personal household items. You should not share dishes, drinking glasses, cups,  eating utensils, towels, or bedding with other people or pets in your home. After using these  items, they should be washed thoroughly with soap and water.  - Clean and disinfect all “high-touch” surfaces every day. High touch surfaces include counters,  tabletops, doorknobs, light switches, remote controls, bathroom fixtures, toilets, phones,  keyboards, tablets, and bedside tables. Also, clean any surfaces that may have blood, stool, or  body fluids on them.       If you develop worsening symptoms:  - If you develop worsening symptoms, such as severe shortness of breath, please call [phone] option #[phone]. They will assist you in determining your next steps.  During your time on self-isolation do the following:  - Work from home if you are able to so.  - Limit social isolation by talking with friends and family on the phone or with face-time  - Talk with friends and relatives who don’t live with you about supporting each other if one  household has to be quarantined. For example, agree to drop groceries or other supplies at the  front door.  - Exercise and spend time outdoors away from others if able to do so.    Why didn’t I get tested for novel coronavirus (COVID-19)?  The number of available tests is very limited so strict rules exist for who is allowed to be tested.  [facility] has been authorized to perform testing and is currently working hard to be  able to start providing the test. Such testing is currently reserved for patients who have had  contact with someone infected with the virus, or those who are very sick a plus those who have  traveled to areas identified by the Centers for Disease Control and Prevention (CD) and will  require hospitalization.  What should I do now?  If you are well enough to be discharged home and are not in a high risk group to have  contracted the COVID-10, you should care for yourself at home exactly like you would if you  have Influenza “flu”. Follow all the standard guidelines about washing your hands, covering  your cough, etc.  You should return to the Emergency Department if you develop worse symptoms, trouble  breathing, chest pain, and/or a fever that doesn’t improve with over the counter  acetaminophen or ibuprofen. Novel Coronavirus (COVID-19)  The Facts  What is a coronavirus?  Coronaviruses are a large family of viruses that cause illnesses ranging from the common cold  to more severe diseases such as Middle East Respiratory Syndrome (MERS) and Severe Acute  Respiratory Syndrome (SARS).  What is Novel Coronavirus (COVID-19)?  COVID-19 is a new strain of Coronavirus that has not been previously identified in humans. COVID-19  was identified in [place], [place], [place] in December 2019 (COVID-19). COVID-19 has  since been identified outside of [place], in a growing number of countries internationally, including  the [place].  Where can I find the most recent information about COVID-19?  The Centers for Disease Control and Prevention (CDC) is closely monitoring the outbreak caused by the  COVID-19. For the latest information about COVID- 19, visit the CDC website at  https://www.cdc.gov/coronavirus/index.html  How are coronaviruses spread?  Coronaviruses can be transmitted from person-to- person, usually after close contact with an infected person,  for example, in a household, workplace, or healthcare setting via droplets that become airborne after a cough  or sneeze by an affected person. These droplets can then infect a nearby person. It is likely transmission also  occurs by touching recently contaminated surfaces.  What are the symptoms of coronavirus infection?  It depends on the virus, but common signs include fever and/or respiratory symptoms such as  cough and shortness of breath. In more severe cases, infection can cause pneumonia, severe acute  respiratory syndrome, kidney failure and even death. Fortunately, most cases of COVID-19 have an  illness no different than the influenza “flu”. With a majority of these patients having mild symptoms  and overall mortality which appears to be not much different than the flu.  Is there a treatment for a COVID-19?  There is no specific treatment for disease caused by COVID-19. However, many of the symptoms can  be treated based on the patient’s clinical condition. Supportive care for infected persons can be highly  effective.  What can I do to protect myself?  Washing your hands, covering your cough, and disinfecting surfaces are the best precautionary  measures. It is also advisable to avoid close contact with anyone showing symptoms of respiratory  illness such as coughing and sneezing. Those with symptoms should wear a surgical mask when  around others.  What can I do to protect those around me?  If you have been identified as someone who may be infected with COVID-19, we recommend you  follow the self-isolation procedures outlined below to protect those around you and limit the spread  of this virus.   March 3, 2020  Recommendations for Patients Advised to Self-Isolate  for Possible COVID-19 Exposure  We recommend the below precautionary steps from now until 14 days from when you  returned from your travel or date of your last known possible contact:  - Do not go to work, school, or public areas. Avoid using public transportation, ride-sharing, or  taxis.  - As much as possible, separate yourself from other people in your home. If you can, you should  stay in a room and away from other people in your home. Also, you should use a separate  bathroom, if available.  - Wear the supplied mask whenever you are around other people.  - If you have a non-urgent medical appointment, please reschedule for a later date. If the  appointment is urgent, please call the healthcare provider and tell them that you are on selfisolation for possible COVID-19. This will help the healthcare provider’s office take steps to keep  other people from getting infected or exposed. If you can reschedule routine appointments, do  so.  - Wash your hands often with soap and water for at least 15 to 20 seconds or clean your hands  with an alcohol-based hand  that contains 60 to 95% alcohol, covering all surfaces of  your hands and rubbing them together until they feel dry. Soap and water should be used  preferentially if hands are visibly dirty.  - Cover your mouth and nose with a tissue when you cough or sneeze. Throw used tissues in a  lined trash can; immediately wash your hands.  - Avoid touching your eyes, nose, and mouth with your hands.  - Avoid sharing personal household items. You should not share dishes, drinking glasses, cups,  eating utensils, towels, or bedding with other people or pets in your home. After using these  items, they should be washed thoroughly with soap and water.  - Clean and disinfect all “high-touch” surfaces every day. High touch surfaces include counters,  tabletops, doorknobs, light switches, remote controls, bathroom fixtures, toilets, phones,  keyboards, tablets, and bedside tables. Also, clean any surfaces that may have blood, stool, or  body fluids on them.       If you develop worsening symptoms:  - If you develop worsening symptoms, such as severe shortness of breath, please call [phone] option #[phone]. They will assist you in determining your next steps.  During your time on self-isolation do the following:  - Work from home if you are able to so.  - Limit social isolation by talking with friends and family on the phone or with face-time  - Talk with friends and relatives who don’t live with you about supporting each other if one  household has to be quarantined. For example, agree to drop groceries or other supplies at the  front door.  - Exercise and spend time outdoors away from others if able to do so.    Why didn’t I get tested for novel coronavirus (COVID-19)?  The number of available tests is very limited so strict rules exist for who is allowed to be tested.  [facility] has been authorized to perform testing and is currently working hard to be  able to start providing the test. Such testing is currently reserved for patients who have had  contact with someone infected with the virus, or those who are very sick a plus those who have  traveled to areas identified by the Centers for Disease Control and Prevention (CD) and will  require hospitalization.  What should I do now?  If you are well enough to be discharged home and are not in a high risk group to have  contracted the COVID-10, you should care for yourself at home exactly like you would if you  have Influenza “flu”. Follow all the standard guidelines about washing your hands, covering  your cough, etc.  You should return to the Emergency Department if you develop worse symptoms, trouble  breathing, chest pain, and/or a fever that doesn’t improve with over the counter  acetaminophen or ibuprofen.

## 2023-12-22 NOTE — ED PROVIDER NOTE - ATTENDING APP SHARED VISIT CONTRIBUTION OF CARE
I have personally performed a history and physical exam on this patient and personally directed the management of the patient. I have personally performed a history and physical exam on this patient and personally directed the management of the patient.  Patient is a 73-year-old male past medical history of hypothyroidism patient recently had a stroke with leaving him with very mild left-sided weakness approximately 6 weeks ago status post tPA presents today for evaluation of increasing generalized weakness states he felt increasing fatigue throughout the past several days as well as cough cold congestion coming to a peak where the patient was having trouble standing up out of a chair secondary to generalized weakness denies any headache visual changes chest pain shortness of breath abdominal pain back pain dysuria hesitancy or frequency    On physical exam overall this patient is nontoxic well-appearing normocephalic atraumatic pupils equal round reactive light accommodation extraocular muscles intact oropharynx clear chest clear to auscultation bilaterally abdomen soft nontender nondistended bowel sounds positive no guarding or rebound radial pulse 2+ pedal pulse 2+ no rashes noted    Assessment plan patient presents for evaluation of generalized weakness patient given IV fluids p.o. Tylenol we obtain labs no large elevation in white blood cell count patient has an INR 1.4 no large electrolyte abnormalities patient tested positive for COVID which is most likely the reason for the symptoms however displays no weakness here he is normal given the degree of weakness that he was feeling we recommended admission for further evaluation however the patient wanted to leave AGAINST MEDICAL ADVICE he is well aware of the consequences of his choices he is alert and oriented x 3 with no evidence of altered mental status he is clear to make his choices he understands in addition to the above patient is aware of the risks he is taking understands he is positive for COVID we will try to improve at home here in the emergency department we obtain chest x-ray per my independent evaluation not consistent with pneumonia not consistent with pneumothorax in addition we obtained EKG per my depend evaluation not consistent with arrhythmia not consistent with STEMI not consistent with QT prolongation patient has a history of atrial fibrillation in the past I informed the patient to return to any emergency department if he changes his mind have a low threshold for reevaluation he is aware and understands

## 2023-12-22 NOTE — ED PROVIDER NOTE - PATIENT PORTAL LINK FT
You can access the FollowMyHealth Patient Portal offered by [facility] by registering at the following website: [email or web address]. By joining [facility]’s FollowMyHealth portal, you will also be able to view your health information using other applications (apps) compatible with our system. You can access the FollowMyHealth Patient Portal offered by [facility] by registering at the following website: [email or web address]. By joining [facility]’s FollowMyHealth portal, you will also be able to view your health information using other applications (apps) compatible with our system.

## 2023-12-22 NOTE — ED PROVIDER NOTE - CLINICAL SUMMARY MEDICAL DECISION MAKING FREE TEXT BOX
patient presents for evaluation of generalized weakness patient given IV fluids p.o. Tylenol we obtain labs no large elevation in white blood cell count patient has an INR 1.4 no large electrolyte abnormalities patient tested positive for COVID which is most likely the reason for the symptoms however displays no weakness here he is normal given the degree of weakness that he was feeling we recommended admission for further evaluation however the patient wanted to leave AGAINST MEDICAL ADVICE he is well aware of the consequences of his choices he is alert and oriented x 3 with no evidence of altered mental status he is clear to make his choices he understands in addition to the above patient is aware of the risks he is taking understands he is positive for COVID we will try to improve at home here in the emergency department we obtain chest x-ray per my independent evaluation not consistent with pneumonia not consistent with pneumothorax in addition we obtained EKG per my depend evaluation not consistent with arrhythmia not consistent with STEMI not consistent with QT prolongation patient has a history of atrial fibrillation in the past I informed the patient to return to any emergency department if he changes his mind have a low threshold for reevaluation he is aware and understands

## 2023-12-26 ENCOUNTER — NON-APPOINTMENT (OUTPATIENT)
Age: 73
End: 2023-12-26

## 2023-12-26 ENCOUNTER — APPOINTMENT (OUTPATIENT)
Dept: CARDIOLOGY | Facility: CLINIC | Age: 73
End: 2023-12-26
Payer: MEDICARE

## 2023-12-27 PROBLEM — I63.9 CEREBRAL INFARCTION, UNSPECIFIED: Chronic | Status: ACTIVE | Noted: 2023-12-22

## 2023-12-27 LAB
-  AMPICILLIN: SIGNIFICANT CHANGE UP
-  AMPICILLIN: SIGNIFICANT CHANGE UP
-  CIPROFLOXACIN: SIGNIFICANT CHANGE UP
-  CIPROFLOXACIN: SIGNIFICANT CHANGE UP
-  LEVOFLOXACIN: SIGNIFICANT CHANGE UP
-  LEVOFLOXACIN: SIGNIFICANT CHANGE UP
-  NITROFURANTOIN: SIGNIFICANT CHANGE UP
-  NITROFURANTOIN: SIGNIFICANT CHANGE UP
-  TETRACYCLINE: SIGNIFICANT CHANGE UP
-  TETRACYCLINE: SIGNIFICANT CHANGE UP
-  VANCOMYCIN: SIGNIFICANT CHANGE UP
-  VANCOMYCIN: SIGNIFICANT CHANGE UP
CULTURE RESULTS: ABNORMAL
CULTURE RESULTS: ABNORMAL
METHOD TYPE: SIGNIFICANT CHANGE UP
METHOD TYPE: SIGNIFICANT CHANGE UP
ORGANISM # SPEC MICROSCOPIC CNT: ABNORMAL
ORGANISM # SPEC MICROSCOPIC CNT: ABNORMAL
ORGANISM # SPEC MICROSCOPIC CNT: SIGNIFICANT CHANGE UP
ORGANISM # SPEC MICROSCOPIC CNT: SIGNIFICANT CHANGE UP
SPECIMEN SOURCE: SIGNIFICANT CHANGE UP
SPECIMEN SOURCE: SIGNIFICANT CHANGE UP

## 2023-12-27 PROCEDURE — 93298 REM INTERROG DEV EVAL SCRMS: CPT

## 2023-12-27 PROCEDURE — G2066: CPT

## 2023-12-28 LAB
CULTURE RESULTS: SIGNIFICANT CHANGE UP
SPECIMEN SOURCE: SIGNIFICANT CHANGE UP

## 2024-01-10 ENCOUNTER — APPOINTMENT (OUTPATIENT)
Dept: PULMONOLOGY | Facility: CLINIC | Age: 74
End: 2024-01-10
Payer: MEDICARE

## 2024-01-10 VITALS
RESPIRATION RATE: 14 BRPM | SYSTOLIC BLOOD PRESSURE: 122 MMHG | OXYGEN SATURATION: 95 % | HEART RATE: 88 BPM | WEIGHT: 240 LBS | DIASTOLIC BLOOD PRESSURE: 70 MMHG | BODY MASS INDEX: 31.81 KG/M2 | HEIGHT: 73 IN

## 2024-01-10 PROCEDURE — 99203 OFFICE O/P NEW LOW 30 MIN: CPT

## 2024-01-10 NOTE — HISTORY OF PRESENT ILLNESS
[Never] : never [TextBox_4] : Mr. PINEDA is a 73 year man with a medical history significant for obesity and atrial fibrillation presenting today to the clinic for evaluation for sleep apnea. He was referred by his cardiologist Dr Kiran.  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 	(  ) Tiredness during the day? 	(  ) Observed overnight apnea? 	(  ) Pressure elevated? 	(  ) BMI > 35? 	( x ) Age > 50? 	(  ) Neck circumference >16in? 	( x ) Gender = male?

## 2024-01-10 NOTE — PHYSICAL EXAM
[No Acute Distress] : no acute distress [Normal Oropharynx] : normal oropharynx [Normal Appearance] : normal appearance [No Neck Mass] : no neck mass [No Resp Distress] : no resp distress [No Abnormalities] : no abnormalities [Benign] : benign [Normal Gait] : normal gait [No Clubbing] : no clubbing [No Cyanosis] : no cyanosis [No Edema] : no edema [FROM] : FROM [Normal Color/ Pigmentation] : normal color/ pigmentation [No Focal Deficits] : no focal deficits [Oriented x3] : oriented x3 [Normal Affect] : normal affect

## 2024-01-22 ENCOUNTER — APPOINTMENT (OUTPATIENT)
Dept: SLEEP CENTER | Facility: HOSPITAL | Age: 74
End: 2024-01-22

## 2024-01-30 ENCOUNTER — NON-APPOINTMENT (OUTPATIENT)
Age: 74
End: 2024-01-30

## 2024-01-30 ENCOUNTER — APPOINTMENT (OUTPATIENT)
Dept: CARDIOLOGY | Facility: CLINIC | Age: 74
End: 2024-01-30
Payer: MEDICARE

## 2024-01-31 PROCEDURE — 93298 REM INTERROG DEV EVAL SCRMS: CPT

## 2024-03-05 ENCOUNTER — NON-APPOINTMENT (OUTPATIENT)
Age: 74
End: 2024-03-05

## 2024-03-05 ENCOUNTER — APPOINTMENT (OUTPATIENT)
Dept: CARDIOLOGY | Facility: CLINIC | Age: 74
End: 2024-03-05
Payer: MEDICARE

## 2024-03-05 PROCEDURE — 93298 REM INTERROG DEV EVAL SCRMS: CPT

## 2024-04-11 ENCOUNTER — APPOINTMENT (OUTPATIENT)
Dept: ELECTROPHYSIOLOGY | Facility: CLINIC | Age: 74
End: 2024-04-11
Payer: MEDICARE

## 2024-04-11 VITALS
BODY MASS INDEX: 31.81 KG/M2 | WEIGHT: 240 LBS | TEMPERATURE: 98 F | SYSTOLIC BLOOD PRESSURE: 132 MMHG | HEIGHT: 73 IN | DIASTOLIC BLOOD PRESSURE: 72 MMHG | HEART RATE: 84 BPM

## 2024-04-11 DIAGNOSIS — Z79.01 LONG TERM (CURRENT) USE OF ANTICOAGULANTS: ICD-10-CM

## 2024-04-11 PROCEDURE — 99214 OFFICE O/P EST MOD 30 MIN: CPT

## 2024-04-11 PROCEDURE — 93285 PRGRMG DEV EVAL SCRMS IP: CPT

## 2024-04-11 NOTE — CARDIOLOGY SUMMARY
[de-identified] : 10/3/2023 NSR (HR 76 bpm) [de-identified] : 08/17/2023:  LVEF of 63 % and no significant valvular disease.       [de-identified] : 10/6/2023: JOANA (JAN) implanted

## 2024-04-11 NOTE — PROCEDURE
[No] : not [See Device Printout] : See device printout [Normal] : The battery status is normal. [Sensing Amplitude ___mv] : sensing amplitude was [unfilled] mv [None] : none [de-identified] : Medtronic ILR [de-identified] : LINQ II [de-identified] : [id number] [de-identified] : 10/06/2023 [de-identified] : Good [de-identified] : 6 AF events, longest lasting >7hrs, max HR of 188bpm 1 tachy event c/w SVT on 12/22/2023 AF Dexter 0.3% The patient is enrolled on remote monitoring.

## 2024-04-11 NOTE — ASSESSMENT
[FreeTextEntry1] : # ILR implantation on 10/06/2023 for A fib management.  - Wound is well healed. There are no signs or symptoms of infection of inflammation. There is no redness, no swelling, no erythema and no pain.  - Device interrogated and reprogrammed as described in procedure. Device function normal.  6 AF events and 1 SVT event. See Device Printout.  - Patient is enrolled in remote monitoring services.  # Paroxysmal AFib with RVR - Pt was symptomatic last summer 2023 and self-converted. CHADS VASc at that time was 1 so he was not on OAC. Patient with h/o admission for TIA and is now on Eliquis 5mg PO BID.  - Cont Eliquis for CHADS VASc 3. No signs/symptoms of bleeding. - Routine labs with PCP/gen cards - I discussed with patient the different management strategies of atrial fibrillation including rate control, rhythm control and ablative therapy. I also discussed with the patient in great length the role of anticoagulation in stroke prevention. Patient expressed understanding of the discussion. Pt is interested in ablation for AF if candidate as he is symptomatic during AF episodes. He will discuss with Dr. Kiran at NOV.  - MARLEY evaluation pending. He has not f/u with pulm. Phone number provided to undergo MRALEY eval.  I have also advised the patient to go to the nearest emergency room if he experiences any chest pain, dyspnea, syncope, or has any other compelling symptoms.  Follow up in 5-6 months/PRN with Dr. Kiran

## 2024-04-11 NOTE — HISTORY OF PRESENT ILLNESS
[FreeTextEntry1] : 73-year-old male retired  and  with history of hypothyroidism, paroxysmal AFib (dx 8/2022 with CHADS VASc 1 not on OAC), and recent admission to [facility] 8/17-8/20/2023 for left sided weakness c/f CVA s/p TNK with improvement of symptoms. Labs were unremarkable. Imaging showed moderated stenosis right M1 MCA w/ irregular stenosis of other distal branches. Patient was given TNK. No definitive stroke evidence. MR brain showed no acute infarct. Of note, patient was hospitalized in Aug 2022 for chest pain and noted to have new onset AFib with RVR that self-converted after starting Diltiazem drip. Patient was discharged with Eliquis 5 mg PO BID and Toprol XL 25 mg daily for one month.  He underwent ILR implantation on 10/06/2023 for A fib management and presents today for routine device interrogation.  The patient denies chest pain/discomfort, dyspnea, palpitations, dizziness, lightheadedness, presyncope or syncope.  Patient does feel "heart racing" when he is in AFib.

## 2024-04-11 NOTE — PHYSICAL EXAM
[Normal Speech] : normal speech [Alert and Oriented] : alert and oriented [de-identified] : left knee scar [Normal Appearance] : normal appearance [General Appearance - In No Acute Distress] : no acute distress [Heart Rate And Rhythm] : heart rate and rhythm were normal [Heart Sounds] : normal S1 and S2 [] : no respiratory distress [Respiration, Rhythm And Depth] : normal respiratory rhythm and effort [Exaggerated Use Of Accessory Muscles For Inspiration] : no accessory muscle use [Clean] : clean [Dry] : dry [Well-Healed] : well-healed [FreeTextEntry1] : parasternal

## 2024-04-12 ENCOUNTER — APPOINTMENT (OUTPATIENT)
Dept: UROLOGY | Facility: CLINIC | Age: 74
End: 2024-04-12
Payer: MEDICARE

## 2024-04-12 VITALS
BODY MASS INDEX: 32.23 KG/M2 | HEIGHT: 72 IN | RESPIRATION RATE: 16 BRPM | WEIGHT: 238 LBS | HEART RATE: 78 BPM | SYSTOLIC BLOOD PRESSURE: 109 MMHG | DIASTOLIC BLOOD PRESSURE: 65 MMHG | OXYGEN SATURATION: 96 % | TEMPERATURE: 98 F

## 2024-04-12 DIAGNOSIS — R39.9 UNSPECIFIED SYMPTOMS AND SIGNS INVOLVING THE GENITOURINARY SYSTEM: ICD-10-CM

## 2024-04-12 DIAGNOSIS — R31.29 OTHER MICROSCOPIC HEMATURIA: ICD-10-CM

## 2024-04-12 LAB
BILIRUB UR QL STRIP: NORMAL
COLLECTION METHOD: NORMAL
GLUCOSE UR-MCNC: NORMAL
HCG UR QL: 0.2 EU/DL
HGB UR QL STRIP.AUTO: NORMAL
KETONES UR-MCNC: NORMAL
LEUKOCYTE ESTERASE UR QL STRIP: NORMAL
NITRITE UR QL STRIP: NORMAL
PH UR STRIP: 5.5
PROT UR STRIP-MCNC: 30
SP GR UR STRIP: 1.02

## 2024-04-12 PROCEDURE — 99204 OFFICE O/P NEW MOD 45 MIN: CPT

## 2024-04-12 RX ORDER — TAMSULOSIN HYDROCHLORIDE 0.4 MG/1
0.4 CAPSULE ORAL
Qty: 90 | Refills: 3 | Status: ACTIVE | COMMUNITY
Start: 2024-04-12 | End: 1900-01-01

## 2024-04-12 NOTE — ASSESSMENT
[FreeTextEntry1] : Bothersome lower urinary tract symptoms.  Urine culture ordered to check for UTI.  In the meantime we will start tamsulosin 0.4 daily as patient is quite bothered by the symptoms and will get renal bladder ultrasound to check on emptying.  Microscopic hematuria.  May be related to anticoagulant but need to rule out other pathologies including stones and tumors.  Will get renal bladder ultrasound and cystoscopy next visit.  Urine culture and cytology ordered.  PSA ordered. [Urinary Symptom or Sign (788.99\R39.89)] : implantation

## 2024-04-12 NOTE — HISTORY OF PRESENT ILLNESS
[FreeTextEntry1] : 73-year-old with bothersome lower urinary tract symptoms for worsening over the last 2 months consisting of nocturia x 3, urinary frequency, poor urinary flow.  Today's urine analysis shows large leukocytes and small blood on dip.  I reviewed a urine analysis with microscopic evaluation from 12/22/2023 which showed 8 red blood cells per high-powered field.  Patient is a non-smoker.  He is on Eliquis for recent A-fib and CVA.  No significant deficits.  History of punctate stone seen on CT which I reviewed from 2021.  PSA 9/15/2022 was 2.54.

## 2024-04-18 LAB
PSA SERPL-MCNC: 7.01 NG/ML
URINE CYTOLOGY: NORMAL

## 2024-04-19 LAB — BACTERIA UR CULT: ABNORMAL

## 2024-04-19 RX ORDER — NITROFURANTOIN (MONOHYDRATE/MACROCRYSTALS) 25; 75 MG/1; MG/1
100 CAPSULE ORAL
Qty: 14 | Refills: 0 | Status: ACTIVE | COMMUNITY
Start: 2024-04-19 | End: 1900-01-01

## 2024-04-23 ENCOUNTER — RESULT REVIEW (OUTPATIENT)
Age: 74
End: 2024-04-23

## 2024-04-23 ENCOUNTER — OUTPATIENT (OUTPATIENT)
Dept: OUTPATIENT SERVICES | Facility: HOSPITAL | Age: 74
LOS: 1 days | End: 2024-04-23
Payer: MEDICARE

## 2024-04-23 DIAGNOSIS — R39.9 UNSPECIFIED SYMPTOMS AND SIGNS INVOLVING THE GENITOURINARY SYSTEM: ICD-10-CM

## 2024-04-23 DIAGNOSIS — Z96.659 PRESENCE OF UNSPECIFIED ARTIFICIAL KNEE JOINT: Chronic | ICD-10-CM

## 2024-04-23 DIAGNOSIS — Z00.8 ENCOUNTER FOR OTHER GENERAL EXAMINATION: ICD-10-CM

## 2024-04-23 PROCEDURE — 76770 US EXAM ABDO BACK WALL COMP: CPT | Mod: 26

## 2024-04-23 PROCEDURE — 76770 US EXAM ABDO BACK WALL COMP: CPT

## 2024-04-24 DIAGNOSIS — R39.9 UNSPECIFIED SYMPTOMS AND SIGNS INVOLVING THE GENITOURINARY SYSTEM: ICD-10-CM

## 2024-04-24 NOTE — OCCUPATIONAL THERAPY INITIAL EVALUATION ADULT - ADAPTIVE EQUIPMENT NEEDED
Subjective:     Patient ID: Poonam Hercules is a 73 y.o. female.    Chief Complaint: Back Pain (Left side sciatica)    Ms Hercules is a 72 yo female sent in consultation by Michelle Pardo for evaluation of left low back pain.  On  she bent a picked up something.  She felt back pain and pain down the left leg.  By Monday she was bent over.  She went to chiropractor and she was sent for x-ray.  The pain improved and she has been going for 6-7 weeks.  She feels like she is better, but something is wrong because she still has numbness on the outside of the left foot and heel.  The pain in minimal.  She still has some pain down the back of the leg.  The pain is constant.  The pain is best in recliner with legs up.  She feels like lying flat makes the numbness feel worse.  She feels ok sitting but does change position.  The pain is throbbing pressure pain.  The pain is 3/10 now, worst 3/10 bending and lifting, best 1/10 recliner.  She went to urgent care and took steroid and muscle relaxer and pain pills. She will take occasional aleve at night.  The chiropractor for decompression. She has not given her HEP.  She has not had other treatment    X-ray of the lumbar spine    Past Medical History:  2022: Essential (hemorrhagic) thrombocythemia  No date: Hyperlipidemia  No date: Hypertension    Past Surgical History:  2023: CATARACT EXTRACTION W/  INTRAOCULAR LENS IMPLANT;   Bilateral  No date:  SECTION  2018: COLONOSCOPY  2018: COLONOSCOPY; N/A      Comment:  Procedure: COLONOSCOPY;  Surgeon: Darian Lopez Jr., MD;  Location: [facility];  Service: Endoscopy;                 Laterality: N/A;  No date: HYSTERECTOMY      Comment:  oo\  No date: TONSILLECTOMY  No date: TUBAL LIGATION    Review of patient's family history indicates:  Problem: Cancer      Relation: Mother          Name: Marilee Ly              Age of Onset: (Not Specified)  Problem: Hypertension       Relation: Mother          Name: Marilee Ly              Age of Onset: (Not Specified)  Problem: Arthritis      Relation: Mother          Name: Marilee Ly              Age of Onset: (Not Specified)  Problem: Dementia      Relation: Mother          Name: Marilee Ly              Age of Onset: (Not Specified)  Problem: Colon cancer      Relation: Mother          Name: Marilee Ly              Age of Onset: (Not Specified)  Problem: Emphysema      Relation: Father          Name:               Age of Onset: (Not Specified)  Problem: Arthritis      Relation: Sister          Name: x2              Age of Onset: (Not Specified)  Problem: Deafness      Relation: Sister          Name: x2              Age of Onset: (Not Specified)  Problem: Hearing loss      Relation: Sister          Name: x2              Age of Onset: (Not Specified)  Problem: No Known Problems      Relation: Brother          Name: x1              Age of Onset: (Not Specified)  Problem: Diabetes      Relation: Daughter          Name: Erika              Age of Onset: (Not Specified)  Problem: Arthritis      Relation: Daughter          Name: Erika              Age of Onset: (Not Specified)  Problem: Depression      Relation: Daughter          Name: Erika              Age of Onset: (Not Specified)  Problem: Miscarriages / Stillbirths      Relation: Daughter          Name: Erika              Age of Onset: (Not Specified)  Problem: Arthritis      Relation: Son          Name: Ac              Age of Onset: (Not Specified)  Problem: Depression      Relation: Son          Name: Ac              Age of Onset: (Not Specified)  Problem: Chronic back pain      Relation: Son          Name: Ac              Age of Onset: (Not Specified)  Problem: Cerebral aneurysm      Relation: Maternal Aunt          Name:               Age of Onset: (Not Specified)  Problem: Emphysema      Relation: Maternal Grandfather          Name:               Age of Onset:  (Not Specified)  Problem: Emphysema      Relation: Paternal Grandfather          Name:               Age of Onset: (Not Specified)      Social History    Socioeconomic History      Marital status:     Tobacco Use      Smoking status: Former        Packs/day: 0.00        Years: 0.5 packs/day for 10.0 years (5.0 ttl pk-yrs)        Types: Cigarettes        Start date: 1967        Quit date: 1977        Years since quittin.3      Smokeless tobacco: Never    Substance and Sexual Activity      Alcohol use: No      Drug use: No      Sexual activity: Not Currently        Partners: Male    Social Determinants of Health  Financial Resource Strain: Low Risk  (3/19/2024)      Overall Financial Resource Strain (CARDIA)          Difficulty of Paying Living Expenses: Not very hard  Food Insecurity: No Food Insecurity (3/19/2024)      Hunger Vital Sign          Worried About Running Out of Food in the Last Year: Never true          Ran Out of Food in the Last Year: Never true  Transportation Needs: No Transportation Needs (3/19/2024)      PRAPARE - Transportation          Lack of Transportation (Medical): No          Lack of Transportation (Non-Medical): No  Physical Activity: Insufficiently Active (3/19/2024)      Exercise Vital Sign          Days of Exercise per Week: 1 day          Minutes of Exercise per Session: 30 min  Stress: No Stress Concern Present (3/19/2024)      [employer] [place] [employer] - Occupational Stress Questionnaire          Feeling of Stress : Not at all  Social Connections: Moderately Integrated (3/19/2024)      Social Connection and Isolation Panel [NHANES]          Frequency of Communication with Friends and Family: More than three times a week          Frequency of Social Gatherings with Friends and Family: More than three times a week          Attends Sikh Services: More than 4 times per year          Active Member of Clubs or Organizations: Yes          Attends Club  or Organization Meetings: More than 4 times per year          Marital Status:   Housing Stability: Low Risk  (3/19/2024)      Housing Stability Vital Sign          Unable to Pay for Housing in the Last Year: No          Number of Places Lived in the Last Year: 1          Unstable Housing in the Last Year: No    Current Outpatient Medications:  alendronate (FOSAMAX) 70 MG tablet, TAKE 1 TABLET EVERY 7 DAYS, Disp: 12 tablet, Rfl: 3  ascorbic acid, vitamin C, (VITAMIN C) 1000 MG tablet, Take 1,000 mg by mouth once daily., Disp: , Rfl:   cetirizine (ZYRTEC) 10 MG tablet, Take 10 mg by mouth once daily., Disp: , Rfl:   ciclopirox (PENLAC) 8 % Soln, Apply topically nightly., Disp: 6.6 mL, Rfl: 1   hydroCHLOROthiazide (HYDRODIURIL) 12.5 MG Tab, TAKE 1 TABLET ONE TIME DAILY FOR HIGH BLOOD PRESSURE, Disp: 90 tablet, Rfl: 0  meloxicam (MOBIC) 7.5 MG tablet, TAKE 1 TABLET EVERY DAY AS NEEDED FOR ARTHRITIS  PAIN AS DIRECTED., Disp: 90 tablet, Rfl: 10  multivitamin-Ca-iron-minerals 27-0.4 mg Tab, , Disp: , Rfl:   omega-3 fatty acids/fish oil (FISH OIL-OMEGA-3 FATTY ACIDS) 300-1,000 mg capsule, Take by mouth once daily., Disp: , Rfl:   VITAMIN B-12 100 MCG tablet, , Disp: , Rfl:   vitamin D 1000 units Tab, Take 1,000 Units by mouth once daily., Disp: , Rfl:     No current facility-administered medications for this visit.      Review of patient's allergies indicates:   -- Niacin -- Other (See Comments)    --  Initial flushing, then unresponsive for 2-3             minutes   -- Poison ivy extract -- Other (See Comments)   -- Tetanus vaccines and toxoid -- Other (See Comments)    --  Unknown          Review of Systems   Constitutional: Negative for weight gain and weight loss.   Cardiovascular:  Negative for chest pain.   Respiratory:  Negative for shortness of breath.    Musculoskeletal:  Positive for back pain. Negative for joint pain and joint swelling.   Gastrointestinal:  Negative for abdominal pain and bowel  incontinence.   Genitourinary:  Negative for bladder incontinence.   Neurological:  Positive for numbness (left lateral foot and right 5th digit).        Objective:     General: Poonam Villatoro is well-developed, well-nourished, appears stated age, in no acute distress, alert and oriented to time, place and person.     General    Vitals reviewed.  Constitutional: She is oriented to person, place, and time. She appears well-developed and well-nourished.   HENT:   Head: Normocephalic and atraumatic.   Pulmonary/Chest: Effort normal.   Neurological: She is alert and oriented to person, place, and time.   Psychiatric: She has a normal mood and affect. Her behavior is normal. Judgment and thought content normal.     General Musculoskeletal Exam   Gait: normal     Right Ankle/Foot Exam     Tests   Heel Walk: able to perform  Tiptoe Walk: able to perform    Left Ankle/Foot Exam     Tests   Heel Walk: able to perform  Tiptoe Walk: unable to perform  Back (L-Spine & T-Spine) / Neck (C-Spine) Exam     Back (L-Spine & T-Spine) Range of Motion   Extension:  30 (with posterior leg pain)   Flexion:  90   Lateral bend right:  20   Lateral bend left:  20   Rotation right:  40   Rotation left:  40     Spinal Sensation   Right Side Sensation  C-Spine Level: normal   L-Spine Level: normal  S-Spine Level: normal  Left Side Sensation  C-Spine Level: normal  L-Spine Level: normal  S-Spine Level: normal    Back (L-Spine & T-Spine) Tests   Right Side Tests  Straight leg raise:        Sitting SLR: > 70 degrees    Left Side Tests  Straight leg raise:       Sitting SLR: > 70 degrees      Other   She has no scoliosis .  Spinal Kyphosis:  Absent      Muscle Strength   Right Upper Extremity   Biceps: 5/5   Deltoid:  5/5  Triceps:  5/5  Wrist extension: 5/5   Finger Flexors:  5/5  Left Upper Extremity  Biceps: 5/5   Deltoid:  5/5  Triceps:  5/5  Wrist extension: 5/5   Finger Flexors:  5/5  Right Lower Extremity   Hip Flexion: 5/5   Quadriceps:  5/5    Anterior tibial:  5/5   EHL:  5/5  Left Lower Extremity   Hip Flexion: 5/5   Quadriceps:  5/5   Anterior tibial:  5/5   Gastrocsoleus:  3/5   EHL:  5/5    Reflexes     Left Side  Biceps:  2+  Triceps:  2+  Brachioradialis:  2+  Achilles:  2+  Left Ayala's Sign:  Absent  Babinski Sign:  absent  Quadriceps:  2+    Right Side   Biceps:  2+  Triceps:  2+  Brachioradialis:  2+  Achilles:  2+  Right Ayala's Sign:  absent  Babinski Sign:  absent  Quadriceps:  2+    Vascular Exam     Right Pulses        Carotid:                  2+    Left Pulses        Carotid:                  2+          Assessment:     1. Lumbar radiculopathy, acute    2. Acute left-sided low back pain with left-sided sciatica    3. Lumbar radiculopathy, chronic         Plan:     Orders Placed This Encounter    X-Ray Lumbar Spine Ap Lateral w/Flex Ext    MRI Lumbar Spine Without Contrast    Ambulatory referral/consult to Physical/Occupational Therapy    diclofenac (VOLTAREN) 75 MG EC tablet     We discussed back pain and the nature of back pain.  We discussed that it will likely improve and has improved and that it is not one thing that causes the pain but an accumulation of multiple things that we do.    X-ray from outside reviewed.    Acute radiculopathy with some left sided weakness that has improved.  She still has some left gastroc weakness but she does feel it has improved.  She does continue with lateral foot numbness and paresthesias  We discussed posture sitting and the importance of trying to sit better.  We discussed the importance of watching posture and how we sit  We discussed the benefits of therapy and exercise and continuing to move. She has been going to chiropractor and that has helped.  We discussed PT and some exercises can help give her tools and further build on relief  PT for back and core strengthening, extension exercises posture training and HEP at [facility]  X-ray lumbar and MRI  We did discuss with pain better  trying PT can help without other aggressive treatments.  We did also discuss injections in the back, S1 dermatome is most affected  RTC 10 weeks    More than 50% of the total time  of 45 minutes was spent face to face in counseling on diagnosis and treatment options. I also counseled patient  on common and most usual side effect of prescribed medications.  I reviewed Primary care , and other specialty's notes to better coordinate patient's care. All questions were answered, and patient voiced understanding.         Follow-up: Follow up in about 10 weeks (around 7/3/2024). If there are any questions prior to this, the patient was instructed to contact the office.        no

## 2024-05-08 ENCOUNTER — APPOINTMENT (OUTPATIENT)
Dept: PULMONOLOGY | Facility: CLINIC | Age: 74
End: 2024-05-08
Payer: MEDICARE

## 2024-05-08 VITALS
SYSTOLIC BLOOD PRESSURE: 120 MMHG | HEART RATE: 89 BPM | BODY MASS INDEX: 31.87 KG/M2 | OXYGEN SATURATION: 95 % | DIASTOLIC BLOOD PRESSURE: 64 MMHG | WEIGHT: 235 LBS

## 2024-05-08 DIAGNOSIS — G47.33 OBSTRUCTIVE SLEEP APNEA (ADULT) (PEDIATRIC): ICD-10-CM

## 2024-05-08 PROCEDURE — 99213 OFFICE O/P EST LOW 20 MIN: CPT

## 2024-05-08 NOTE — HISTORY OF PRESENT ILLNESS
[Never] : never [TextBox_4] : Mr. PINEDA is a 73 year man with a medical history significant for obesity and atrial fibrillation presenting today to the clinic for evaluation for sleep apnea. He was referred by his cardiologist Dr Kiran.  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 	(  ) Tiredness during the day? 	(  ) Observed overnight apnea? 	(  ) Pressure elevated? 	(  ) BMI > 35? 	( x ) Age > 50? 	(  ) Neck circumference >16in? 	( x ) Gender = male?  Interval history:  Patient did not show up for his sleep study, reports that his symptoms are now resolved with treatment of BPH by urology.  However when questioned further, the above apnea screening is still at the same rate, and patient did have atrial fibrillation ablation.

## 2024-05-13 ENCOUNTER — NON-APPOINTMENT (OUTPATIENT)
Age: 74
End: 2024-05-13

## 2024-05-13 ENCOUNTER — APPOINTMENT (OUTPATIENT)
Dept: CARDIOLOGY | Facility: CLINIC | Age: 74
End: 2024-05-13
Payer: MEDICARE

## 2024-05-13 PROCEDURE — 93298 REM INTERROG DEV EVAL SCRMS: CPT

## 2024-06-01 ENCOUNTER — EMERGENCY (EMERGENCY)
Facility: HOSPITAL | Age: 74
LOS: 0 days | Discharge: HOME | End: 2024-06-01
Attending: EMERGENCY MEDICINE
Payer: MEDICARE

## 2024-06-01 VITALS
SYSTOLIC BLOOD PRESSURE: 110 MMHG | DIASTOLIC BLOOD PRESSURE: 75 MMHG | RESPIRATION RATE: 17 BRPM | OXYGEN SATURATION: 99 % | TEMPERATURE: 97 F | HEART RATE: 94 BPM | WEIGHT: 235.01 LBS

## 2024-06-01 VITALS
SYSTOLIC BLOOD PRESSURE: 114 MMHG | DIASTOLIC BLOOD PRESSURE: 76 MMHG | OXYGEN SATURATION: 99 % | HEART RATE: 90 BPM | RESPIRATION RATE: 18 BRPM

## 2024-06-01 DIAGNOSIS — I48.0 PAROXYSMAL ATRIAL FIBRILLATION: ICD-10-CM

## 2024-06-01 DIAGNOSIS — R42 DIZZINESS AND GIDDINESS: ICD-10-CM

## 2024-06-01 DIAGNOSIS — Z79.01 LONG TERM (CURRENT) USE OF ANTICOAGULANTS: ICD-10-CM

## 2024-06-01 DIAGNOSIS — R00.2 PALPITATIONS: ICD-10-CM

## 2024-06-01 DIAGNOSIS — E03.9 HYPOTHYROIDISM, UNSPECIFIED: ICD-10-CM

## 2024-06-01 DIAGNOSIS — R07.89 OTHER CHEST PAIN: ICD-10-CM

## 2024-06-01 DIAGNOSIS — Z96.659 PRESENCE OF UNSPECIFIED ARTIFICIAL KNEE JOINT: Chronic | ICD-10-CM

## 2024-06-01 DIAGNOSIS — Z86.73 PERSONAL HISTORY OF TRANSIENT ISCHEMIC ATTACK (TIA), AND CEREBRAL INFARCTION WITHOUT RESIDUAL DEFICITS: ICD-10-CM

## 2024-06-01 LAB
ALBUMIN SERPL ELPH-MCNC: 3.9 G/DL — SIGNIFICANT CHANGE UP (ref 3.5–5.2)
ALP SERPL-CCNC: 95 U/L — SIGNIFICANT CHANGE UP (ref 30–115)
ALT FLD-CCNC: 14 U/L — SIGNIFICANT CHANGE UP (ref 0–41)
ANION GAP SERPL CALC-SCNC: 10 MMOL/L — SIGNIFICANT CHANGE UP (ref 7–14)
AST SERPL-CCNC: 14 U/L — SIGNIFICANT CHANGE UP (ref 0–41)
BASOPHILS # BLD AUTO: 0.05 K/UL — SIGNIFICANT CHANGE UP (ref 0–0.2)
BASOPHILS NFR BLD AUTO: 0.7 % — SIGNIFICANT CHANGE UP (ref 0–1)
BILIRUB SERPL-MCNC: 0.4 MG/DL — SIGNIFICANT CHANGE UP (ref 0.2–1.2)
BUN SERPL-MCNC: 15 MG/DL — SIGNIFICANT CHANGE UP (ref 10–20)
CALCIUM SERPL-MCNC: 8.8 MG/DL — SIGNIFICANT CHANGE UP (ref 8.4–10.5)
CHLORIDE SERPL-SCNC: 106 MMOL/L — SIGNIFICANT CHANGE UP (ref 98–110)
CO2 SERPL-SCNC: 26 MMOL/L — SIGNIFICANT CHANGE UP (ref 17–32)
CREAT SERPL-MCNC: 1.1 MG/DL — SIGNIFICANT CHANGE UP (ref 0.7–1.5)
EGFR: 70 ML/MIN/1.73M2 — SIGNIFICANT CHANGE UP
EOSINOPHIL # BLD AUTO: 0.17 K/UL — SIGNIFICANT CHANGE UP (ref 0–0.7)
EOSINOPHIL NFR BLD AUTO: 2.3 % — SIGNIFICANT CHANGE UP (ref 0–8)
GLUCOSE SERPL-MCNC: 99 MG/DL — SIGNIFICANT CHANGE UP (ref 70–99)
HCT VFR BLD CALC: 46.1 % — SIGNIFICANT CHANGE UP (ref 42–52)
HGB BLD-MCNC: 15.4 G/DL — SIGNIFICANT CHANGE UP (ref 14–18)
IMM GRANULOCYTES NFR BLD AUTO: 0.4 % — HIGH (ref 0.1–0.3)
LIDOCAIN IGE QN: 52 U/L — SIGNIFICANT CHANGE UP (ref 7–60)
LYMPHOCYTES # BLD AUTO: 1.99 K/UL — SIGNIFICANT CHANGE UP (ref 1.2–3.4)
LYMPHOCYTES # BLD AUTO: 26.5 % — SIGNIFICANT CHANGE UP (ref 20.5–51.1)
MAGNESIUM SERPL-MCNC: 2 MG/DL — SIGNIFICANT CHANGE UP (ref 1.8–2.4)
MCHC RBC-ENTMCNC: 30.6 PG — SIGNIFICANT CHANGE UP (ref 27–31)
MCHC RBC-ENTMCNC: 33.4 G/DL — SIGNIFICANT CHANGE UP (ref 32–37)
MCV RBC AUTO: 91.5 FL — SIGNIFICANT CHANGE UP (ref 80–94)
MONOCYTES # BLD AUTO: 0.96 K/UL — HIGH (ref 0.1–0.6)
MONOCYTES NFR BLD AUTO: 12.8 % — HIGH (ref 1.7–9.3)
NEUTROPHILS # BLD AUTO: 4.31 K/UL — SIGNIFICANT CHANGE UP (ref 1.4–6.5)
NEUTROPHILS NFR BLD AUTO: 57.3 % — SIGNIFICANT CHANGE UP (ref 42.2–75.2)
NRBC # BLD: 0 /100 WBCS — SIGNIFICANT CHANGE UP (ref 0–0)
NT-PROBNP SERPL-SCNC: 84 PG/ML — SIGNIFICANT CHANGE UP (ref 0–300)
PLATELET # BLD AUTO: 254 K/UL — SIGNIFICANT CHANGE UP (ref 130–400)
PMV BLD: 8.9 FL — SIGNIFICANT CHANGE UP (ref 7.4–10.4)
POTASSIUM SERPL-MCNC: 4.7 MMOL/L — SIGNIFICANT CHANGE UP (ref 3.5–5)
POTASSIUM SERPL-SCNC: 4.7 MMOL/L — SIGNIFICANT CHANGE UP (ref 3.5–5)
PROT SERPL-MCNC: 6 G/DL — SIGNIFICANT CHANGE UP (ref 6–8)
RBC # BLD: 5.04 M/UL — SIGNIFICANT CHANGE UP (ref 4.7–6.1)
RBC # FLD: 13.4 % — SIGNIFICANT CHANGE UP (ref 11.5–14.5)
SODIUM SERPL-SCNC: 142 MMOL/L — SIGNIFICANT CHANGE UP (ref 135–146)
TROPONIN SAMPLING TIME: SIGNIFICANT CHANGE UP
TROPONIN T, HIGH SENSITIVITY RESULT: 15 NG/L — SIGNIFICANT CHANGE UP (ref 6–21)
WBC # BLD: 7.51 K/UL — SIGNIFICANT CHANGE UP (ref 4.8–10.8)
WBC # FLD AUTO: 7.51 K/UL — SIGNIFICANT CHANGE UP (ref 4.8–10.8)

## 2024-06-01 PROCEDURE — 84484 ASSAY OF TROPONIN QUANT: CPT

## 2024-06-01 PROCEDURE — 99285 EMERGENCY DEPT VISIT HI MDM: CPT | Mod: FS

## 2024-06-01 PROCEDURE — 83880 ASSAY OF NATRIURETIC PEPTIDE: CPT

## 2024-06-01 PROCEDURE — 93005 ELECTROCARDIOGRAM TRACING: CPT

## 2024-06-01 PROCEDURE — 85025 COMPLETE CBC W/AUTO DIFF WBC: CPT

## 2024-06-01 PROCEDURE — 71045 X-RAY EXAM CHEST 1 VIEW: CPT | Mod: 26

## 2024-06-01 PROCEDURE — 83735 ASSAY OF MAGNESIUM: CPT

## 2024-06-01 PROCEDURE — 36415 COLL VENOUS BLD VENIPUNCTURE: CPT

## 2024-06-01 PROCEDURE — 83690 ASSAY OF LIPASE: CPT

## 2024-06-01 PROCEDURE — 99285 EMERGENCY DEPT VISIT HI MDM: CPT | Mod: 25

## 2024-06-01 PROCEDURE — 71045 X-RAY EXAM CHEST 1 VIEW: CPT

## 2024-06-01 PROCEDURE — 93010 ELECTROCARDIOGRAM REPORT: CPT

## 2024-06-01 PROCEDURE — 80053 COMPREHEN METABOLIC PANEL: CPT

## 2024-06-01 PROCEDURE — 36000 PLACE NEEDLE IN VEIN: CPT

## 2024-06-01 RX ORDER — METOPROLOL TARTRATE 50 MG
1 TABLET ORAL
Qty: 14 | Refills: 0
Start: 2024-06-01 | End: 2024-06-14

## 2024-06-01 NOTE — ED PROVIDER NOTE - OBJECTIVE STATEMENT
74 year old male with a history of AF diagnosed in Aug 2022, retired  and  with history of hypothyroidism, paroxysmal AFib (dx 8/2022 with CHADS VASc 1 not on OAC), CVA 08/17/23 s/p TNK presents to the ED with chest pressure. Around 1pm today patient stood up and developed a sudden onset of lightheadedness, palpitations and chest pressure. Upon EMS arrival he had an abnormal EKG which showed wide complex tachycardia. On arrival to ED patient is rate controlled with improvement in symptoms. No medications were given in the field. At this time patient denies fever, chills, chest pain, shortness of breath, palpitations, abdominal pain, nausea, vomiting, diarrhea, constipation, dysuria, hematuria, lower extremity swelling, rash

## 2024-06-01 NOTE — ED PROVIDER NOTE - PATIENT PORTAL LINK FT
You can access the FollowMyHealth Patient Portal offered by [facility] by registering at the following website: [email or web address]. By joining [employer]’s FollowMyHealth portal, you will also be able to view your health information using other applications (apps) compatible with our system.

## 2024-06-01 NOTE — ED PROVIDER NOTE - ATTENDING APP SHARED VISIT CONTRIBUTION OF CARE
Patient is a 74-year-old male with history of A-fib on Eliquis followed by EP Dr. Kiran comes in after developing palpitations and lightheadedness when trying to stand.  Symptoms now improved after fluids by EMS.    Exam: Regular rate, lungs clear, no JVD or lower extremity edema, no acute distress  Plan: Labs, x-ray, EKG, discuss with cardio    Number of diagnoses or management options:  [ ] Referral or consultation made    Complexity of data reviewed:  [ ] Decision made to obtain old record(s) or additional history from the family, caretaker, or other source  [x] Laboratory test(s) ordered and/or reviewed  [ ] Independent interpretation of EKG by Dr. Terrell Gallo:  [ ] Independent interpretation of Radiology by Dr. Terrell Gallo:   [ ] I, Terrell Gallo, had a discussion with    Risk (Management Options):  [ ] High: emergency surgery; monitored drug therapy; controlled parenteral therapy; decision for DNR Patient is a 74-year-old male with history of A-fib on Eliquis followed by EP Dr. Kiran comes in after developing palpitations and lightheadedness when trying to stand.  Symptoms now improved after fluids by EMS.    Exam: Regular rate, lungs clear, no JVD or lower extremity edema, no acute distress  Plan: Labs, x-ray, EKG, discuss with cardio    Number of diagnoses or management options:  [x] Referral or consultation made: EP    Complexity of data reviewed:  [ ] Decision made to obtain old record(s) or additional history from the family, caretaker, or other source  [x] Laboratory test(s) ordered and/or reviewed  [x] Independent interpretation of EKG by Dr. Terrell Gallo: NSR  [x] Independent interpretation of Radiology by Dr. Terrell Gallo: [facility]   [x] I, Terrell Gallo, had a discussion with PMD Dr. Walter Randall - converted to NSR, can f/u on Tuesday if labs WNL    Risk (Management Options):  [ ] High: emergency surgery; monitored drug therapy; controlled parenteral therapy; decision for DNR

## 2024-06-01 NOTE — ED PROVIDER NOTE - PROVIDER TOKENS
PROVIDER:[TOKEN:[[id number]],SCHEDULEDAPPT:[06/04/2024],ESTABLISHEDPATIENT:[T]],PROVIDER:[TOKEN:[[id number]],SCHEDULEDAPPT:[06/04/2024],ESTABLISHEDPATIENT:[T]]

## 2024-06-01 NOTE — ED PROVIDER NOTE - CARE PROVIDER_API CALL
Walter Randall  Internal Medicine  [address] [place] [address]  Phone: [phone]  Fax: [phone]  Established Patient  Scheduled Appointment: 06/04/2024    Katarina Kiran  Cardiovascular Disease  [address] [place] [id number]  Phone: [phone]  Fax: [phone]  Established Patient  Scheduled Appointment: 06/04/2024

## 2024-06-01 NOTE — ED ADULT TRIAGE NOTE - CHIEF COMPLAINT QUOTE
facial swelling and hives after eating peanut butter and jelly sandwich. BIBA  pt had pressure in his chest. As per EMS pt had afib then SVT back to AFib. EMS inserted L Hand # 18 . 500 cc of fluids infused.

## 2024-06-04 ENCOUNTER — APPOINTMENT (OUTPATIENT)
Dept: UROLOGY | Facility: CLINIC | Age: 74
End: 2024-06-04

## 2024-06-04 ENCOUNTER — NON-APPOINTMENT (OUTPATIENT)
Age: 74
End: 2024-06-04

## 2024-06-04 ENCOUNTER — APPOINTMENT (OUTPATIENT)
Dept: ELECTROPHYSIOLOGY | Facility: CLINIC | Age: 74
End: 2024-06-04
Payer: MEDICARE

## 2024-06-04 VITALS
DIASTOLIC BLOOD PRESSURE: 81 MMHG | BODY MASS INDEX: 31.14 KG/M2 | WEIGHT: 235 LBS | SYSTOLIC BLOOD PRESSURE: 140 MMHG | HEIGHT: 73 IN | OXYGEN SATURATION: 97 % | HEART RATE: 92 BPM

## 2024-06-04 DIAGNOSIS — Z45.09 ENCOUNTER FOR ADJUSTMENT AND MANAGEMENT OF OTHER CARDIAC DEVICE: ICD-10-CM

## 2024-06-04 DIAGNOSIS — Z48.89 ENCOUNTER FOR OTHER SPECIFIED SURGICAL AFTERCARE: ICD-10-CM

## 2024-06-04 DIAGNOSIS — I48.0 PAROXYSMAL ATRIAL FIBRILLATION: ICD-10-CM

## 2024-06-04 DIAGNOSIS — Z86.73 PERSONAL HISTORY OF TRANSIENT ISCHEMIC ATTACK (TIA), AND CEREBRAL INFARCTION W/OUT RESIDUAL DEFICITS: ICD-10-CM

## 2024-06-04 PROCEDURE — G2211 COMPLEX E/M VISIT ADD ON: CPT

## 2024-06-04 PROCEDURE — 93285 PRGRMG DEV EVAL SCRMS IP: CPT

## 2024-06-04 PROCEDURE — 93000 ELECTROCARDIOGRAM COMPLETE: CPT | Mod: 59

## 2024-06-04 PROCEDURE — 99215 OFFICE O/P EST HI 40 MIN: CPT

## 2024-06-13 ENCOUNTER — OUTPATIENT (OUTPATIENT)
Dept: OUTPATIENT SERVICES | Facility: HOSPITAL | Age: 74
LOS: 1 days | End: 2024-06-13
Payer: MEDICARE

## 2024-06-13 ENCOUNTER — RESULT REVIEW (OUTPATIENT)
Age: 74
End: 2024-06-13

## 2024-06-13 ENCOUNTER — APPOINTMENT (OUTPATIENT)
Dept: SLEEP CENTER | Facility: HOSPITAL | Age: 74
End: 2024-06-13

## 2024-06-13 VITALS
WEIGHT: 253.97 LBS | DIASTOLIC BLOOD PRESSURE: 76 MMHG | RESPIRATION RATE: 16 BRPM | SYSTOLIC BLOOD PRESSURE: 132 MMHG | HEART RATE: 74 BPM | OXYGEN SATURATION: 95 % | TEMPERATURE: 98 F | HEIGHT: 73 IN

## 2024-06-13 DIAGNOSIS — I48.19 OTHER PERSISTENT ATRIAL FIBRILLATION: ICD-10-CM

## 2024-06-13 DIAGNOSIS — Z98.1 ARTHRODESIS STATUS: Chronic | ICD-10-CM

## 2024-06-13 DIAGNOSIS — Z96.659 PRESENCE OF UNSPECIFIED ARTIFICIAL KNEE JOINT: Chronic | ICD-10-CM

## 2024-06-13 DIAGNOSIS — Z98.890 OTHER SPECIFIED POSTPROCEDURAL STATES: Chronic | ICD-10-CM

## 2024-06-13 DIAGNOSIS — Z01.818 ENCOUNTER FOR OTHER PREPROCEDURAL EXAMINATION: ICD-10-CM

## 2024-06-13 LAB — BLD GP AB SCN SERPL QL: SIGNIFICANT CHANGE UP

## 2024-06-13 PROCEDURE — 86850 RBC ANTIBODY SCREEN: CPT

## 2024-06-13 PROCEDURE — 86901 BLOOD TYPING SEROLOGIC RH(D): CPT

## 2024-06-13 PROCEDURE — 36415 COLL VENOUS BLD VENIPUNCTURE: CPT

## 2024-06-13 PROCEDURE — 99214 OFFICE O/P EST MOD 30 MIN: CPT | Mod: 25

## 2024-06-13 PROCEDURE — 86900 BLOOD TYPING SEROLOGIC ABO: CPT

## 2024-06-13 PROCEDURE — 71046 X-RAY EXAM CHEST 2 VIEWS: CPT | Mod: 26

## 2024-06-13 PROCEDURE — 71046 X-RAY EXAM CHEST 2 VIEWS: CPT

## 2024-06-13 NOTE — H&P PST ADULT - NSANTHOSAYNRD_GEN_A_CORE
No. MARLEY screening performed.  STOP BANG Legend: 0-2 = LOW Risk; 3-4 = INTERMEDIATE Risk; 5-8 = HIGH Risk

## 2024-06-13 NOTE — H&P PST ADULT - NSICDXPASTMEDICALHX_GEN_ALL_CORE_FT
PAST MEDICAL HISTORY:  Afib     BPH (benign prostatic hyperplasia)     Hypothyroid     Obesity     Stroke

## 2024-06-13 NOTE — H&P PST ADULT - HISTORY OF PRESENT ILLNESS
74yr old male with h/o Afib presents to [facility]ST in prep for Afib ablation , MISHEL. Denies COVID /URI /S./S. H/O TIA 8/2023.  Revised Cardiac Risk Index for Pre-Operative Risk from [employer]  on 6/13/2024  ** All calculations should be rechecked by clinician prior to use **    RESULT SUMMARY:  1 points  Class II Risk    6.0 %  30-day risk of death, MI, or cardiac arrest    Anesthesia Alert  YES--Difficult Airway class 4  NO--History of neck surgery or radiation  NO--Limited ROM of neck  NO--History of Malignant hyperthermia  NO--Personal or family history of Pseudocholinesterase deficiency  NO--Prior Anesthesia Complication  NO--Latex Allergy  NO--Loose teeth  NO--History of Rheumatoid Arthritis  NO--MARLEY  yes Bleeding risk  NO--Other_____   From Hanane 2017. These numbers are higher than those from the original study (Derrell 1999). See Evidence for details.      INPUTS:  Elevated-risk surgery —> 0 = No  History of ischemic heart disease —> 0 = No  History of congestive heart failure —> 0 = No  History of cerebrovascular disease —> 1 = Yes  Pre-operative treatment with insulin —> 0 = No  Pre-operative creatinine >2 mg/dL / 176.8 µmol/L —> 0 = No  Duke Activity Status Index (DASI) from [employer]  on 6/13/2024  ** All calculations should be rechecked by clinician prior to use **    RESULT SUMMARY:  50.7 points  The higher the score (maximum 58.2), the higher the functional status.    8.97 METs        INPUTS:  Take care of self —> 2.75 = Yes  Walk indoors —> 1.75 = Yes  Walk 1&ndash;2 blocks on level ground —> 2.75 = Yes  Climb a flight of stairs or walk up a hill —> 5.5 = Yes  Run a short distance —> 8 = Yes  Do light work around the house —> 2.7 = Yes  Do moderate work around the house —> 3.5 = Yes  Do heavy work around the house —> 8 = Yes  Do yardwork —> 4.5 = Yes  Have sexual relations —> 5.25 = Yes  Participate in moderate recreational activities —> 6 = Yes  Participate in strenuous sports —> 0 = No

## 2024-06-14 DIAGNOSIS — Z01.818 ENCOUNTER FOR OTHER PREPROCEDURAL EXAMINATION: ICD-10-CM

## 2024-06-14 DIAGNOSIS — I48.19 OTHER PERSISTENT ATRIAL FIBRILLATION: ICD-10-CM

## 2024-06-27 ENCOUNTER — APPOINTMENT (OUTPATIENT)
Dept: ELECTROPHYSIOLOGY | Facility: HOSPITAL | Age: 74
End: 2024-06-27

## 2024-06-27 ENCOUNTER — TRANSCRIPTION ENCOUNTER (OUTPATIENT)
Age: 74
End: 2024-06-27

## 2024-06-27 ENCOUNTER — INPATIENT (INPATIENT)
Facility: HOSPITAL | Age: 74
LOS: 0 days | Discharge: HOME | DRG: 274 | End: 2024-06-28
Attending: STUDENT IN AN ORGANIZED HEALTH CARE EDUCATION/TRAINING PROGRAM | Admitting: STUDENT IN AN ORGANIZED HEALTH CARE EDUCATION/TRAINING PROGRAM
Payer: MEDICARE

## 2024-06-27 VITALS
RESPIRATION RATE: 18 BRPM | HEIGHT: 73 IN | SYSTOLIC BLOOD PRESSURE: 143 MMHG | HEART RATE: 75 BPM | WEIGHT: 246.92 LBS | TEMPERATURE: 98 F | OXYGEN SATURATION: 96 % | DIASTOLIC BLOOD PRESSURE: 85 MMHG

## 2024-06-27 DIAGNOSIS — Z98.1 ARTHRODESIS STATUS: Chronic | ICD-10-CM

## 2024-06-27 DIAGNOSIS — I48.19 OTHER PERSISTENT ATRIAL FIBRILLATION: ICD-10-CM

## 2024-06-27 DIAGNOSIS — Z98.890 OTHER SPECIFIED POSTPROCEDURAL STATES: Chronic | ICD-10-CM

## 2024-06-27 LAB — BLD GP AB SCN SERPL QL: SIGNIFICANT CHANGE UP

## 2024-06-27 PROCEDURE — C1733: CPT

## 2024-06-27 PROCEDURE — 93312 ECHO TRANSESOPHAGEAL: CPT | Mod: 26

## 2024-06-27 PROCEDURE — C9113: CPT

## 2024-06-27 PROCEDURE — 93656 COMPRE EP EVAL ABLTJ ATR FIB: CPT

## 2024-06-27 PROCEDURE — C1766: CPT

## 2024-06-27 PROCEDURE — 93005 ELECTROCARDIOGRAM TRACING: CPT

## 2024-06-27 PROCEDURE — 93320 DOPPLER ECHO COMPLETE: CPT | Mod: 26

## 2024-06-27 PROCEDURE — C1759: CPT

## 2024-06-27 PROCEDURE — 93285 PRGRMG DEV EVAL SCRMS IP: CPT | Mod: 26

## 2024-06-27 PROCEDURE — C1769: CPT

## 2024-06-27 PROCEDURE — 76937 US GUIDE VASCULAR ACCESS: CPT | Mod: 26

## 2024-06-27 PROCEDURE — C1730: CPT

## 2024-06-27 PROCEDURE — 93325 DOPPLER ECHO COLOR FLOW MAPG: CPT | Mod: 26

## 2024-06-27 PROCEDURE — C1887: CPT

## 2024-06-27 PROCEDURE — C1894: CPT

## 2024-06-27 RX ORDER — APIXABAN 2.5 MG/1
1 TABLET, FILM COATED ORAL
Refills: 0 | DISCHARGE

## 2024-06-27 RX ORDER — FAMOTIDINE 40 MG
40 TABLET ORAL DAILY
Refills: 0 | Status: DISCONTINUED | OUTPATIENT
Start: 2024-06-27 | End: 2024-06-27

## 2024-06-27 RX ORDER — TAMSULOSIN HYDROCHLORIDE 0.4 MG/1
0.4 CAPSULE ORAL AT BEDTIME
Refills: 0 | Status: DISCONTINUED | OUTPATIENT
Start: 2024-06-27 | End: 2024-06-28

## 2024-06-27 RX ORDER — ATORVASTATIN CALCIUM 20 MG/1
80 TABLET, FILM COATED ORAL AT BEDTIME
Refills: 0 | Status: DISCONTINUED | OUTPATIENT
Start: 2024-06-27 | End: 2024-06-28

## 2024-06-27 RX ORDER — TAMSULOSIN HYDROCHLORIDE 0.4 MG/1
1 CAPSULE ORAL
Refills: 0 | DISCHARGE

## 2024-06-27 RX ORDER — FAMOTIDINE 40 MG
20 TABLET ORAL ONCE
Refills: 0 | Status: COMPLETED | OUTPATIENT
Start: 2024-06-27 | End: 2024-06-27

## 2024-06-27 RX ORDER — PANTOPRAZOLE SODIUM 40 MG/10ML
40 INJECTION, POWDER, FOR SOLUTION INTRAVENOUS
Refills: 0 | Status: DISCONTINUED | OUTPATIENT
Start: 2024-06-28 | End: 2024-06-28

## 2024-06-27 RX ORDER — CEFAZOLIN 10 G/1
2000 INJECTION, POWDER, FOR SOLUTION INTRAVENOUS ONCE
Refills: 0 | Status: COMPLETED | OUTPATIENT
Start: 2024-06-27 | End: 2024-06-27

## 2024-06-27 RX ORDER — APIXABAN 5 MG/1
5 TABLET, FILM COATED ORAL EVERY 12 HOURS
Refills: 0 | Status: DISCONTINUED | OUTPATIENT
Start: 2024-06-27 | End: 2024-06-28

## 2024-06-27 RX ORDER — LEVOTHYROXINE SODIUM 25 MCG
125 TABLET ORAL DAILY
Refills: 0 | Status: DISCONTINUED | OUTPATIENT
Start: 2024-06-27 | End: 2024-06-28

## 2024-06-27 RX ORDER — PANTOPRAZOLE SODIUM 40 MG/10ML
40 INJECTION, POWDER, FOR SOLUTION INTRAVENOUS ONCE
Refills: 0 | Status: COMPLETED | OUTPATIENT
Start: 2024-06-27 | End: 2024-06-27

## 2024-06-27 RX ORDER — LIDOCAINE HYDROCHLORIDE,EPINEPHRINE BITARTRATE 15; .005 MG/ML; MG/ML
10 INJECTION, SOLUTION EPIDURAL; INFILTRATION; INTRACAUDAL; PERINEURAL ONCE
Refills: 0 | Status: DISCONTINUED | OUTPATIENT
Start: 2024-06-27 | End: 2024-06-28

## 2024-06-27 RX ORDER — METOPROLOL TARTRATE 50 MG
25 TABLET ORAL DAILY
Refills: 0 | Status: DISCONTINUED | OUTPATIENT
Start: 2024-06-27 | End: 2024-06-28

## 2024-06-27 RX ADMIN — PANTOPRAZOLE SODIUM 40 MILLIGRAM(S): 40 INJECTION, POWDER, FOR SOLUTION INTRAVENOUS at 13:18

## 2024-06-27 RX ADMIN — ATORVASTATIN CALCIUM 80 MILLIGRAM(S): 20 TABLET, FILM COATED ORAL at 21:36

## 2024-06-27 RX ADMIN — CEFAZOLIN 100 MILLIGRAM(S): 10 INJECTION, POWDER, FOR SOLUTION INTRAVENOUS at 08:40

## 2024-06-27 RX ADMIN — APIXABAN 5 MILLIGRAM(S): 5 TABLET, FILM COATED ORAL at 21:36

## 2024-06-27 RX ADMIN — APIXABAN 5 MILLIGRAM(S): 5 TABLET, FILM COATED ORAL at 13:19

## 2024-06-27 RX ADMIN — Medication 20 MILLIGRAM(S): at 11:34

## 2024-06-27 RX ADMIN — TAMSULOSIN HYDROCHLORIDE 0.4 MILLIGRAM(S): 0.4 CAPSULE ORAL at 21:36

## 2024-06-28 ENCOUNTER — TRANSCRIPTION ENCOUNTER (OUTPATIENT)
Age: 74
End: 2024-06-28

## 2024-06-28 VITALS
OXYGEN SATURATION: 96 % | SYSTOLIC BLOOD PRESSURE: 120 MMHG | DIASTOLIC BLOOD PRESSURE: 73 MMHG | TEMPERATURE: 98 F | HEART RATE: 94 BPM | RESPIRATION RATE: 18 BRPM

## 2024-06-28 PROCEDURE — 93010 ELECTROCARDIOGRAM REPORT: CPT

## 2024-06-28 PROCEDURE — 99238 HOSP IP/OBS DSCHRG MGMT 30/<: CPT

## 2024-06-28 RX ORDER — METOPROLOL TARTRATE 50 MG
1 TABLET ORAL
Qty: 30 | Refills: 0
Start: 2024-06-28 | End: 2024-07-27

## 2024-06-28 RX ORDER — PANTOPRAZOLE SODIUM 40 MG/10ML
1 INJECTION, POWDER, FOR SOLUTION INTRAVENOUS
Qty: 30 | Refills: 0
Start: 2024-06-28 | End: 2024-07-27

## 2024-06-28 RX ORDER — ATORVASTATIN CALCIUM 20 MG/1
1 TABLET, FILM COATED ORAL
Qty: 30 | Refills: 0
Start: 2024-06-28 | End: 2024-07-27

## 2024-06-28 RX ADMIN — Medication 125 MICROGRAM(S): at 05:48

## 2024-06-28 RX ADMIN — PANTOPRAZOLE SODIUM 40 MILLIGRAM(S): 40 INJECTION, POWDER, FOR SOLUTION INTRAVENOUS at 05:49

## 2024-06-28 RX ADMIN — APIXABAN 5 MILLIGRAM(S): 5 TABLET, FILM COATED ORAL at 05:48

## 2024-06-28 RX ADMIN — Medication 25 MILLIGRAM(S): at 05:49

## 2024-07-01 PROBLEM — N40.0 BENIGN PROSTATIC HYPERPLASIA WITHOUT LOWER URINARY TRACT SYMPTOMS: Chronic | Status: ACTIVE | Noted: 2024-06-13

## 2024-07-01 PROBLEM — I48.91 UNSPECIFIED ATRIAL FIBRILLATION: Chronic | Status: ACTIVE | Noted: 2024-06-13

## 2024-07-01 PROBLEM — E66.9 OBESITY, UNSPECIFIED: Chronic | Status: ACTIVE | Noted: 2024-06-13

## 2024-07-02 DIAGNOSIS — Z98.1 ARTHRODESIS STATUS: ICD-10-CM

## 2024-07-02 DIAGNOSIS — I48.0 PAROXYSMAL ATRIAL FIBRILLATION: ICD-10-CM

## 2024-07-02 DIAGNOSIS — N40.0 BENIGN PROSTATIC HYPERPLASIA WITHOUT LOWER URINARY TRACT SYMPTOMS: ICD-10-CM

## 2024-07-02 DIAGNOSIS — E66.9 OBESITY, UNSPECIFIED: ICD-10-CM

## 2024-07-02 DIAGNOSIS — Z79.01 LONG TERM (CURRENT) USE OF ANTICOAGULANTS: ICD-10-CM

## 2024-07-02 DIAGNOSIS — Z86.73 PERSONAL HISTORY OF TRANSIENT ISCHEMIC ATTACK (TIA), AND CEREBRAL INFARCTION WITHOUT RESIDUAL DEFICITS: ICD-10-CM

## 2024-07-02 DIAGNOSIS — E03.9 HYPOTHYROIDISM, UNSPECIFIED: ICD-10-CM

## 2024-07-02 DIAGNOSIS — E78.5 HYPERLIPIDEMIA, UNSPECIFIED: ICD-10-CM

## 2024-07-11 ENCOUNTER — APPOINTMENT (OUTPATIENT)
Dept: PULMONOLOGY | Facility: CLINIC | Age: 74
End: 2024-07-11

## 2024-07-24 ENCOUNTER — APPOINTMENT (OUTPATIENT)
Dept: ELECTROPHYSIOLOGY | Facility: CLINIC | Age: 74
End: 2024-07-24
Payer: MEDICARE

## 2024-07-24 VITALS
TEMPERATURE: 97.1 F | HEIGHT: 73 IN | SYSTOLIC BLOOD PRESSURE: 124 MMHG | HEART RATE: 80 BPM | DIASTOLIC BLOOD PRESSURE: 76 MMHG | WEIGHT: 235 LBS | BODY MASS INDEX: 31.14 KG/M2

## 2024-07-24 DIAGNOSIS — Z45.09 ENCOUNTER FOR ADJUSTMENT AND MANAGEMENT OF OTHER CARDIAC DEVICE: ICD-10-CM

## 2024-07-24 DIAGNOSIS — Z86.73 PERSONAL HISTORY OF TRANSIENT ISCHEMIC ATTACK (TIA), AND CEREBRAL INFARCTION W/OUT RESIDUAL DEFICITS: ICD-10-CM

## 2024-07-24 DIAGNOSIS — I48.0 PAROXYSMAL ATRIAL FIBRILLATION: ICD-10-CM

## 2024-07-24 PROCEDURE — 93000 ELECTROCARDIOGRAM COMPLETE: CPT | Mod: 59

## 2024-07-24 PROCEDURE — 93285 PRGRMG DEV EVAL SCRMS IP: CPT | Mod: 59

## 2024-07-24 PROCEDURE — G2211 COMPLEX E/M VISIT ADD ON: CPT

## 2024-07-24 PROCEDURE — 99214 OFFICE O/P EST MOD 30 MIN: CPT

## 2024-07-24 NOTE — CARDIOLOGY SUMMARY
[de-identified] : 7/24/2024 NSR (HR 80 bpm) 6/4/2024 NSR (HR 86 bpm) 10/3/2023 NSR (HR 76 bpm) [de-identified] : 6/3/2024 EF 51% (low normal EF). Mild LVH.  8/17/2023 LVEF 63% and no significant valvular disease. Normal LA size.     [de-identified] : 10/6/2023: JOANA (JAN) implanted

## 2024-07-24 NOTE — HISTORY OF PRESENT ILLNESS
[de-identified] :  Referring: Dr. Randall  73 yo M retired  and  with history of hypothyroidism, paroxysmal AFib (dx 8/2022 with CHADS VASc 1 initially), admitted to [facility] 8/17-8/20/2023 for left sided weakness c/f CVA s/p TNK with improvement of symptoms. Imaging showed moderated stenosis right M1 MCA w/ irregular stenosis of other distal branches. Patient given TNK. No definitive stroke evidence. MR brain showed no acute infarct. Of note, patient was hospitalized in Aug 2022 for chest pain and noted to have new onset AFib with RVR that self-converted after starting Diltiazem drip. Patient was discharged with Eliquis 5 mg PO BID and Toprol XL 25 mg daily for one month.  6/4/2024 He underwent ILR implantation on 10/6/2023 for AFib management and presents today for routine device interrogation and to discuss ablation for recent episodes of AF. He feels his "heart racing" when in AFib. Recently went to the hospital on 6/1 for palpitations. Noted to have HR up to 200 bpm with EMS.   7/24/2024 Patient is here for routine follow up after ablation. Feels well. Had a few "twinges" after his ablation but nothing significant. He denies chest pain/discomfort, dyspnea, palpitations, dizziness, lightheadedness, presyncope or syncope.

## 2024-07-24 NOTE — PROCEDURE
[No] : not [NSR] : normal sinus rhythm [See Device Printout] : See device printout [Normal] : The battery status is normal. [Sensing Amplitude ___mv] : sensing amplitude was [unfilled] mv [Programmed for Longevity] : output reprogrammed for improved battery longevity [Counters Reset] : the counters were reset [de-identified] : 100 bpm [de-identified] : Medtronic ILR [de-identified] : LINQ II [de-identified] : [id number] [de-identified] : 10/06/2023 [de-identified] : 88 bpm [de-identified] : Good [de-identified] : 1 Possible AF event on July 3, 2024 for 40 minutes AF burden: 0.6%  PVC burden: <1% Transmitting on [facility].

## 2024-07-24 NOTE — PHYSICAL EXAM
[General Appearance - Well Developed] : well developed [Normal Appearance] : normal appearance [Well Groomed] : well groomed [General Appearance - Well Nourished] : well nourished [No Deformities] : no deformities [General Appearance - In No Acute Distress] : no acute distress [Heart Rate And Rhythm] : heart rate and rhythm were normal [Heart Sounds] : normal S1 and S2 [Murmurs] : no murmurs present [Edema] : no peripheral edema present [] : no respiratory distress [Respiration, Rhythm And Depth] : normal respiratory rhythm and effort [Exaggerated Use Of Accessory Muscles For Inspiration] : no accessory muscle use [Auscultation Breath Sounds / Voice Sounds] : lungs were clear to auscultation bilaterally [Clean] : clean [Dry] : dry [Well-Healed] : well-healed [Abdomen Soft] : soft [Nail Clubbing] : no clubbing of the fingernails [FreeTextEntry1] : Bilateral groins well healed. No hematoma, ecchymosis, or bleeding noted.

## 2024-07-24 NOTE — DISCUSSION/SUMMARY
[FreeTextEntry1] : We had an extensive conversation regarding the nature of atrial fibrillation, including potential etiologies, underlying pathophysiology and natural history of the disease. In addition, the potential risk of thromboembolic events and assessment of that risk were discussed. I have emphasized the importance of continuing anticoagulation.

## 2024-07-24 NOTE — ASSESSMENT
[FreeTextEntry1] : # ILR implantation on 10/6/2023 for AFib management.  - Device interrogated and reprogrammed as described in procedure. Device function normal.   - Remote monitor is set up and patient is transmitting.   # Symptomatic Paroxysmal AFib with RVR - Pt symptomatic summer 2023 and self-converted. CHADS VASc at that time was 1 so he was not on OAC. Patient then had TIA, so is now on Eliquis 5 mg PO BID.  - Cont Eliquis 5 mg PO BID for CHADS VASc 3. No signs/symptoms of bleeding. - s/p AF ablation. There are no groin hematomas or bleeding. Patient is pleased with the results of catheter ablation, although is aware with the risk of recurrent symptoms and need for another ablation. Post ablation care was discussed in great length. I discussed with patient contacting me in case of any symptoms suggestive of recurrence.  I have also advised the patient to go to the nearest emergency room if he experiences any chest pain, dyspnea, syncope, or has any other compelling symptoms.  Follow up in 3 mo

## 2024-08-28 ENCOUNTER — APPOINTMENT (OUTPATIENT)
Dept: CARDIOLOGY | Facility: CLINIC | Age: 74
End: 2024-08-28
Payer: MEDICARE

## 2024-08-28 ENCOUNTER — NON-APPOINTMENT (OUTPATIENT)
Age: 74
End: 2024-08-28

## 2024-08-28 PROCEDURE — 93298 REM INTERROG DEV EVAL SCRMS: CPT

## 2024-10-02 ENCOUNTER — NON-APPOINTMENT (OUTPATIENT)
Age: 74
End: 2024-10-02

## 2024-10-02 ENCOUNTER — APPOINTMENT (OUTPATIENT)
Dept: CARDIOLOGY | Facility: CLINIC | Age: 74
End: 2024-10-02
Payer: MEDICARE

## 2024-10-02 PROCEDURE — 93298 REM INTERROG DEV EVAL SCRMS: CPT

## 2024-10-09 ENCOUNTER — APPOINTMENT (OUTPATIENT)
Dept: ELECTROPHYSIOLOGY | Facility: CLINIC | Age: 74
End: 2024-10-09

## 2024-11-05 ENCOUNTER — APPOINTMENT (OUTPATIENT)
Dept: ELECTROPHYSIOLOGY | Facility: CLINIC | Age: 74
End: 2024-11-05
Payer: MEDICARE

## 2024-11-05 VITALS
HEART RATE: 85 BPM | HEIGHT: 73 IN | BODY MASS INDEX: 30.75 KG/M2 | DIASTOLIC BLOOD PRESSURE: 80 MMHG | WEIGHT: 232 LBS | SYSTOLIC BLOOD PRESSURE: 118 MMHG

## 2024-11-05 DIAGNOSIS — Z45.09 ENCOUNTER FOR ADJUSTMENT AND MANAGEMENT OF OTHER CARDIAC DEVICE: ICD-10-CM

## 2024-11-05 DIAGNOSIS — I48.0 PAROXYSMAL ATRIAL FIBRILLATION: ICD-10-CM

## 2024-11-05 DIAGNOSIS — Z86.73 PERSONAL HISTORY OF TRANSIENT ISCHEMIC ATTACK (TIA), AND CEREBRAL INFARCTION W/OUT RESIDUAL DEFICITS: ICD-10-CM

## 2024-11-05 PROCEDURE — 99214 OFFICE O/P EST MOD 30 MIN: CPT

## 2024-11-05 PROCEDURE — G2211 COMPLEX E/M VISIT ADD ON: CPT

## 2024-11-05 PROCEDURE — 93285 PRGRMG DEV EVAL SCRMS IP: CPT

## 2024-11-05 PROCEDURE — 93000 ELECTROCARDIOGRAM COMPLETE: CPT | Mod: 59

## 2024-11-05 NOTE — ASSESSMENT
[FreeTextEntry1] : # ILR implantation on 10/6/2023 for AFib management.  - Device interrogated and reprogrammed as described in procedure. Device function normal.  No more AF. - Remote monitor is set up and patient is transmitting.   # Symptomatic Paroxysmal AFib with RVR s/p Cryo 6/27/2024 - Pt symptomatic summer 2023 and self-converted. CHADS VASc at that time was 1 so he was not on OAC. Patient then had TIA, so is now on Eliquis 5 mg PO BID.  - Cont Eliquis 5 mg PO BID for CHADS VASc at least 4 (Age 74, TIA). No signs/symptoms of bleeding. - Denies cardiovascular symptoms.  I have also advised the patient to go to the nearest emergency room if he experiences any chest pain, dyspnea, syncope, or has any other compelling symptoms.  Follow up in 6 mo

## 2024-11-05 NOTE — PROCEDURE
[No] : not [NSR] : normal sinus rhythm [See Device Printout] : See device printout [Normal] : The battery status is normal. [Programmed for Longevity] : output reprogrammed for improved battery longevity [Counters Reset] : the counters were reset [Sensing Amplitude ___mv] : sensing amplitude was [unfilled] mv [de-identified] : 95 bpm [de-identified] : Medtronic ILR [de-identified] : LINQ II [de-identified] : [id number] [de-identified] : 10/06/2023 [de-identified] : Turned off AT recording - made it only AF recording. Tachy episodes are set to 140bpm, so should catch aflutter. [de-identified] : Good [de-identified] : Multiple AT episodes c/w sinus tach longest lasting >2 min (with avg. v-rate = 100bpm) False tachy episode c/w oversensing noise.  AF burden: <0.1% // PVC burden: <1% Transmitting on [facility].

## 2024-11-05 NOTE — CARDIOLOGY SUMMARY
[de-identified] : 11/5/2024 NSR (HR 85 bpm), occ PVCs 7/24/2024 NSR (HR 80 bpm) 6/4/2024 NSR (HR 86 bpm) 10/3/2023 NSR (HR 76 bpm) [de-identified] : 6/3/2024 EF 51% (low normal EF). Mild LVH.  8/17/2023 LVEF 63% and no significant valvular disease. Normal LA size.     [de-identified] : CCTA 6/5/2024 Total Ca score 6.19 [de-identified] : 10/6/2023: JOE (CRISTOPHER) implanted [de-identified] : Cryo AF ablation 6/27/2024

## 2024-11-05 NOTE — PHYSICAL EXAM
[General Appearance - Well Developed] : well developed [Normal Appearance] : normal appearance [Well Groomed] : well groomed [General Appearance - Well Nourished] : well nourished [No Deformities] : no deformities [General Appearance - In No Acute Distress] : no acute distress [Heart Rate And Rhythm] : heart rate and rhythm were normal [Heart Sounds] : normal S1 and S2 [Murmurs] : no murmurs present [Edema] : no peripheral edema present [] : no respiratory distress [Respiration, Rhythm And Depth] : normal respiratory rhythm and effort [Exaggerated Use Of Accessory Muscles For Inspiration] : no accessory muscle use [Auscultation Breath Sounds / Voice Sounds] : lungs were clear to auscultation bilaterally [Clean] : clean [Dry] : dry [Well-Healed] : well-healed [Abdomen Soft] : soft [Nail Clubbing] : no clubbing of the fingernails [FreeTextEntry1] : obese

## 2024-12-10 ENCOUNTER — NON-APPOINTMENT (OUTPATIENT)
Age: 74
End: 2024-12-10

## 2024-12-10 ENCOUNTER — APPOINTMENT (OUTPATIENT)
Dept: CARDIOLOGY | Facility: CLINIC | Age: 74
End: 2024-12-10
Payer: MEDICARE

## 2024-12-10 PROCEDURE — 93298 REM INTERROG DEV EVAL SCRMS: CPT

## 2025-01-14 ENCOUNTER — NON-APPOINTMENT (OUTPATIENT)
Age: 75
End: 2025-01-14

## 2025-01-14 ENCOUNTER — APPOINTMENT (OUTPATIENT)
Dept: CARDIOLOGY | Facility: CLINIC | Age: 75
End: 2025-01-14
Payer: MEDICARE

## 2025-01-14 PROCEDURE — 93298 REM INTERROG DEV EVAL SCRMS: CPT

## 2025-02-18 ENCOUNTER — NON-APPOINTMENT (OUTPATIENT)
Age: 75
End: 2025-02-18

## 2025-02-18 ENCOUNTER — APPOINTMENT (OUTPATIENT)
Dept: CARDIOLOGY | Facility: CLINIC | Age: 75
End: 2025-02-18
Payer: MEDICARE

## 2025-02-18 PROCEDURE — 93298 REM INTERROG DEV EVAL SCRMS: CPT

## 2025-03-25 ENCOUNTER — APPOINTMENT (OUTPATIENT)
Dept: CARDIOLOGY | Facility: CLINIC | Age: 75
End: 2025-03-25
Payer: MEDICARE

## 2025-03-25 ENCOUNTER — NON-APPOINTMENT (OUTPATIENT)
Age: 75
End: 2025-03-25

## 2025-03-25 PROCEDURE — 93298 REM INTERROG DEV EVAL SCRMS: CPT

## 2025-04-14 ENCOUNTER — RX RENEWAL (OUTPATIENT)
Age: 75
End: 2025-04-14

## 2025-04-29 ENCOUNTER — APPOINTMENT (OUTPATIENT)
Dept: CARDIOLOGY | Facility: CLINIC | Age: 75
End: 2025-04-29
Payer: MEDICARE

## 2025-04-29 ENCOUNTER — NON-APPOINTMENT (OUTPATIENT)
Age: 75
End: 2025-04-29

## 2025-04-29 PROCEDURE — 93298 REM INTERROG DEV EVAL SCRMS: CPT

## 2025-06-03 ENCOUNTER — NON-APPOINTMENT (OUTPATIENT)
Age: 75
End: 2025-06-03

## 2025-06-03 ENCOUNTER — APPOINTMENT (OUTPATIENT)
Dept: CARDIOLOGY | Facility: CLINIC | Age: 75
End: 2025-06-03
Payer: MEDICARE

## 2025-06-03 PROCEDURE — 93298 REM INTERROG DEV EVAL SCRMS: CPT

## 2025-07-08 ENCOUNTER — NON-APPOINTMENT (OUTPATIENT)
Age: 75
End: 2025-07-08

## 2025-07-08 ENCOUNTER — APPOINTMENT (OUTPATIENT)
Dept: CARDIOLOGY | Facility: CLINIC | Age: 75
End: 2025-07-08
Payer: MEDICARE

## 2025-07-08 PROCEDURE — 93298 REM INTERROG DEV EVAL SCRMS: CPT

## 2025-08-12 ENCOUNTER — NON-APPOINTMENT (OUTPATIENT)
Age: 75
End: 2025-08-12

## 2025-08-12 ENCOUNTER — APPOINTMENT (OUTPATIENT)
Dept: CARDIOLOGY | Facility: CLINIC | Age: 75
End: 2025-08-12
Payer: MEDICARE

## 2025-08-12 PROCEDURE — 93298 REM INTERROG DEV EVAL SCRMS: CPT

## 2025-08-26 ENCOUNTER — EMERGENCY (EMERGENCY)
Facility: HOSPITAL | Age: 75
LOS: 0 days | Discharge: HOME | End: 2025-08-26
Attending: EMERGENCY MEDICINE
Payer: COMMERCIAL

## 2025-08-26 VITALS
HEIGHT: 73 IN | SYSTOLIC BLOOD PRESSURE: 135 MMHG | TEMPERATURE: 98 F | RESPIRATION RATE: 20 BRPM | DIASTOLIC BLOOD PRESSURE: 87 MMHG | WEIGHT: 235.01 LBS | HEART RATE: 85 BPM | OXYGEN SATURATION: 98 %

## 2025-08-26 VITALS
DIASTOLIC BLOOD PRESSURE: 84 MMHG | SYSTOLIC BLOOD PRESSURE: 134 MMHG | TEMPERATURE: 98 F | RESPIRATION RATE: 18 BRPM | OXYGEN SATURATION: 97 % | HEART RATE: 85 BPM

## 2025-08-26 DIAGNOSIS — Z98.890 OTHER SPECIFIED POSTPROCEDURAL STATES: Chronic | ICD-10-CM

## 2025-08-26 DIAGNOSIS — I48.91 UNSPECIFIED ATRIAL FIBRILLATION: ICD-10-CM

## 2025-08-26 DIAGNOSIS — M65.322 TRIGGER FINGER, LEFT INDEX FINGER: ICD-10-CM

## 2025-08-26 DIAGNOSIS — M65.332 TRIGGER FINGER, LEFT MIDDLE FINGER: ICD-10-CM

## 2025-08-26 DIAGNOSIS — R07.89 OTHER CHEST PAIN: ICD-10-CM

## 2025-08-26 DIAGNOSIS — L05.01 PILONIDAL CYST WITH ABSCESS: ICD-10-CM

## 2025-08-26 DIAGNOSIS — Z98.1 ARTHRODESIS STATUS: Chronic | ICD-10-CM

## 2025-08-26 DIAGNOSIS — Z79.01 LONG TERM (CURRENT) USE OF ANTICOAGULANTS: ICD-10-CM

## 2025-08-26 LAB
ALBUMIN SERPL ELPH-MCNC: 4.1 G/DL — SIGNIFICANT CHANGE UP (ref 3.5–5.2)
ALP SERPL-CCNC: 101 U/L — SIGNIFICANT CHANGE UP (ref 30–115)
ALT FLD-CCNC: 10 U/L — SIGNIFICANT CHANGE UP (ref 0–41)
ANION GAP SERPL CALC-SCNC: 13 MMOL/L — SIGNIFICANT CHANGE UP (ref 7–14)
AST SERPL-CCNC: 17 U/L — SIGNIFICANT CHANGE UP (ref 0–41)
BASOPHILS # BLD AUTO: 0.07 K/UL — SIGNIFICANT CHANGE UP (ref 0–0.2)
BASOPHILS NFR BLD AUTO: 0.9 % — SIGNIFICANT CHANGE UP (ref 0–2)
BILIRUB SERPL-MCNC: 0.6 MG/DL — SIGNIFICANT CHANGE UP (ref 0.2–1.2)
BUN SERPL-MCNC: 13 MG/DL — SIGNIFICANT CHANGE UP (ref 10–20)
CALCIUM SERPL-MCNC: 9 MG/DL — SIGNIFICANT CHANGE UP (ref 8.4–10.5)
CHLORIDE SERPL-SCNC: 104 MMOL/L — SIGNIFICANT CHANGE UP (ref 98–110)
CO2 SERPL-SCNC: 24 MMOL/L — SIGNIFICANT CHANGE UP (ref 17–32)
CREAT SERPL-MCNC: 1.2 MG/DL — SIGNIFICANT CHANGE UP (ref 0.7–1.5)
EGFR: 63 ML/MIN/1.73M2 — SIGNIFICANT CHANGE UP
EGFR: 63 ML/MIN/1.73M2 — SIGNIFICANT CHANGE UP
EOSINOPHIL # BLD AUTO: 0.28 K/UL — SIGNIFICANT CHANGE UP (ref 0–0.5)
EOSINOPHIL NFR BLD AUTO: 3.5 % — SIGNIFICANT CHANGE UP (ref 0–6)
GLUCOSE SERPL-MCNC: 90 MG/DL — SIGNIFICANT CHANGE UP (ref 70–99)
HCT VFR BLD CALC: 45.3 % — SIGNIFICANT CHANGE UP (ref 39–50)
HGB BLD-MCNC: 15.3 G/DL — SIGNIFICANT CHANGE UP (ref 13–17)
IMM GRANULOCYTES # BLD AUTO: 0.02 K/UL — SIGNIFICANT CHANGE UP (ref 0–0.07)
IMM GRANULOCYTES NFR BLD AUTO: 0.2 % — SIGNIFICANT CHANGE UP (ref 0–0.9)
LYMPHOCYTES # BLD AUTO: 2.09 K/UL — SIGNIFICANT CHANGE UP (ref 1–3.3)
LYMPHOCYTES NFR BLD AUTO: 25.9 % — SIGNIFICANT CHANGE UP (ref 13–44)
MCHC RBC-ENTMCNC: 30.7 PG — SIGNIFICANT CHANGE UP (ref 27–34)
MCHC RBC-ENTMCNC: 33.8 G/DL — SIGNIFICANT CHANGE UP (ref 32–36)
MCV RBC AUTO: 91 FL — SIGNIFICANT CHANGE UP (ref 80–100)
MONOCYTES # BLD AUTO: 1.05 K/UL — HIGH (ref 0–0.9)
MONOCYTES NFR BLD AUTO: 13 % — SIGNIFICANT CHANGE UP (ref 2–14)
NEUTROPHILS # BLD AUTO: 4.57 K/UL — SIGNIFICANT CHANGE UP (ref 1.8–7.4)
NEUTROPHILS NFR BLD AUTO: 56.5 % — SIGNIFICANT CHANGE UP (ref 43–77)
NRBC # BLD AUTO: 0 K/UL — SIGNIFICANT CHANGE UP (ref 0–0)
NRBC # FLD: 0 K/UL — SIGNIFICANT CHANGE UP (ref 0–0)
NRBC BLD AUTO-RTO: 0 /100 WBCS — SIGNIFICANT CHANGE UP (ref 0–0)
PLATELET # BLD AUTO: 253 K/UL — SIGNIFICANT CHANGE UP (ref 150–400)
PMV BLD: 9.7 FL — SIGNIFICANT CHANGE UP (ref 7–13)
POTASSIUM SERPL-MCNC: 4.6 MMOL/L — SIGNIFICANT CHANGE UP (ref 3.5–5)
POTASSIUM SERPL-SCNC: 4.6 MMOL/L — SIGNIFICANT CHANGE UP (ref 3.5–5)
PROT SERPL-MCNC: 6.2 G/DL — SIGNIFICANT CHANGE UP (ref 6–8)
RBC # BLD: 4.98 M/UL — SIGNIFICANT CHANGE UP (ref 4.2–5.8)
RBC # FLD: 13.2 % — SIGNIFICANT CHANGE UP (ref 10.3–14.5)
SODIUM SERPL-SCNC: 141 MMOL/L — SIGNIFICANT CHANGE UP (ref 135–146)
TROPONIN T, HIGH SENSITIVITY RESULT: 13 NG/L — SIGNIFICANT CHANGE UP (ref 6–21)
TROPONIN T, HIGH SENSITIVITY RESULT: 13 NG/L — SIGNIFICANT CHANGE UP (ref 6–21)
WBC # BLD: 8.08 K/UL — SIGNIFICANT CHANGE UP (ref 3.8–10.5)
WBC # FLD AUTO: 8.08 K/UL — SIGNIFICANT CHANGE UP (ref 3.8–10.5)

## 2025-08-26 PROCEDURE — 99285 EMERGENCY DEPT VISIT HI MDM: CPT | Mod: 25

## 2025-08-26 PROCEDURE — 84484 ASSAY OF TROPONIN QUANT: CPT

## 2025-08-26 PROCEDURE — 36415 COLL VENOUS BLD VENIPUNCTURE: CPT

## 2025-08-26 PROCEDURE — 80053 COMPREHEN METABOLIC PANEL: CPT

## 2025-08-26 PROCEDURE — 93010 ELECTROCARDIOGRAM REPORT: CPT

## 2025-08-26 PROCEDURE — 85025 COMPLETE CBC W/AUTO DIFF WBC: CPT

## 2025-08-26 PROCEDURE — 10080 I&D PILONIDAL CYST SIMPLE: CPT

## 2025-08-26 PROCEDURE — 71045 X-RAY EXAM CHEST 1 VIEW: CPT

## 2025-08-26 PROCEDURE — 71045 X-RAY EXAM CHEST 1 VIEW: CPT | Mod: 26

## 2025-08-26 PROCEDURE — 93005 ELECTROCARDIOGRAM TRACING: CPT

## 2025-08-26 RX ORDER — AMOXICILLIN AND CLAVULANATE POTASSIUM 500; 125 MG/1; MG/1
1 TABLET, FILM COATED ORAL
Qty: 14 | Refills: 0
Start: 2025-08-26 | End: 2025-09-01

## 2025-09-15 ENCOUNTER — APPOINTMENT (OUTPATIENT)
Dept: CARDIOLOGY | Facility: CLINIC | Age: 75
End: 2025-09-15
Payer: MEDICARE

## 2025-09-15 ENCOUNTER — NON-APPOINTMENT (OUTPATIENT)
Age: 75
End: 2025-09-15

## 2025-09-15 PROCEDURE — 93298 REM INTERROG DEV EVAL SCRMS: CPT
